# Patient Record
Sex: MALE | Race: BLACK OR AFRICAN AMERICAN | NOT HISPANIC OR LATINO
[De-identification: names, ages, dates, MRNs, and addresses within clinical notes are randomized per-mention and may not be internally consistent; named-entity substitution may affect disease eponyms.]

---

## 2019-01-17 ENCOUNTER — RESULT REVIEW (OUTPATIENT)
Age: 54
End: 2019-01-17

## 2019-11-26 ENCOUNTER — APPOINTMENT (OUTPATIENT)
Dept: OTOLARYNGOLOGY | Facility: CLINIC | Age: 54
End: 2019-11-26
Payer: COMMERCIAL

## 2019-11-26 VITALS
HEIGHT: 67 IN | DIASTOLIC BLOOD PRESSURE: 89 MMHG | WEIGHT: 178 LBS | SYSTOLIC BLOOD PRESSURE: 129 MMHG | BODY MASS INDEX: 27.94 KG/M2

## 2019-11-26 PROBLEM — Z00.00 ENCOUNTER FOR PREVENTIVE HEALTH EXAMINATION: Status: ACTIVE | Noted: 2019-11-26

## 2019-11-26 PROCEDURE — 31575 DIAGNOSTIC LARYNGOSCOPY: CPT

## 2019-11-26 PROCEDURE — 99204 OFFICE O/P NEW MOD 45 MIN: CPT | Mod: 25

## 2019-11-26 NOTE — PHYSICAL EXAM
[de-identified] : submental and right indurated masses. not painful, mobile.  [Midline] : trachea located in midline position [Normal] : orientation to person, place, and time: normal

## 2019-11-26 NOTE — HISTORY OF PRESENT ILLNESS
[de-identified] : Patient here today as a new patient c/o neck and chin mass.\par Discovered by his dentist 5 months ago. It has not changed in size. He also feels another LN on the right side of his neck. \par LNs not painful. \par No change in voice or swallowing. \par No recent infections.\par Never smoked cigarettes. \par

## 2019-12-02 ENCOUNTER — OTHER (OUTPATIENT)
Age: 54
End: 2019-12-02

## 2019-12-11 ENCOUNTER — OUTPATIENT (OUTPATIENT)
Dept: OUTPATIENT SERVICES | Facility: HOSPITAL | Age: 54
LOS: 1 days | Discharge: HOME | End: 2019-12-11

## 2019-12-11 ENCOUNTER — LABORATORY RESULT (OUTPATIENT)
Age: 54
End: 2019-12-11

## 2019-12-12 DIAGNOSIS — E04.9 NONTOXIC GOITER, UNSPECIFIED: ICD-10-CM

## 2019-12-18 ENCOUNTER — OUTPATIENT (OUTPATIENT)
Dept: OUTPATIENT SERVICES | Facility: HOSPITAL | Age: 54
LOS: 1 days | Discharge: HOME | End: 2019-12-18

## 2019-12-18 ENCOUNTER — LABORATORY RESULT (OUTPATIENT)
Age: 54
End: 2019-12-18

## 2019-12-18 ENCOUNTER — APPOINTMENT (OUTPATIENT)
Dept: OTOLARYNGOLOGY | Facility: CLINIC | Age: 54
End: 2019-12-18
Payer: COMMERCIAL

## 2019-12-18 DIAGNOSIS — Z22.1 CARRIER OF OTHER INTESTINAL INFECTIOUS DISEASES: ICD-10-CM

## 2019-12-18 PROCEDURE — 99214 OFFICE O/P EST MOD 30 MIN: CPT

## 2019-12-18 NOTE — PHYSICAL EXAM
[de-identified] : submental and right indurated masses. not painful, mobile.  [Midline] : trachea located in midline position [Normal] : no rashes

## 2019-12-18 NOTE — HISTORY OF PRESENT ILLNESS
[FreeTextEntry1] : 12/18/19 Patient is returning for a neck mass. he was sent for an FNA but did not do his CT scan. [de-identified] : Patient here today as a new patient c/o neck and chin mass.\par Discovered by his dentist 5 months ago. It has not changed in size. He also feels another LN on the right side of his neck. \par LNs not painful. \par No change in voice or swallowing. \par No recent infections.\par Never smoked cigarettes. \par

## 2019-12-18 NOTE — ASSESSMENT
[FreeTextEntry1] : I reviewed patient's FNA results with him.\par I also told him I spoke with Dr Junior regarding his results, we had discussed the need of an open biopsy.\par \par I recommended a CT scan at this time to plan his surgery.

## 2019-12-24 ENCOUNTER — OUTPATIENT (OUTPATIENT)
Dept: OUTPATIENT SERVICES | Facility: HOSPITAL | Age: 54
LOS: 1 days | Discharge: HOME | End: 2019-12-24
Payer: COMMERCIAL

## 2019-12-24 VITALS
TEMPERATURE: 98 F | OXYGEN SATURATION: 99 % | RESPIRATION RATE: 16 BRPM | DIASTOLIC BLOOD PRESSURE: 76 MMHG | HEART RATE: 70 BPM | WEIGHT: 194.01 LBS | HEIGHT: 67 IN | SYSTOLIC BLOOD PRESSURE: 132 MMHG

## 2019-12-24 DIAGNOSIS — R22.1 LOCALIZED SWELLING, MASS AND LUMP, NECK: ICD-10-CM

## 2019-12-24 DIAGNOSIS — N20.0 CALCULUS OF KIDNEY: Chronic | ICD-10-CM

## 2019-12-24 DIAGNOSIS — Z01.818 ENCOUNTER FOR OTHER PREPROCEDURAL EXAMINATION: ICD-10-CM

## 2019-12-24 DIAGNOSIS — Z98.890 OTHER SPECIFIED POSTPROCEDURAL STATES: Chronic | ICD-10-CM

## 2019-12-24 LAB
ALBUMIN SERPL ELPH-MCNC: 4.2 G/DL — SIGNIFICANT CHANGE UP (ref 3.5–5.2)
ALP SERPL-CCNC: 39 U/L — SIGNIFICANT CHANGE UP (ref 30–115)
ALT FLD-CCNC: 7 U/L — SIGNIFICANT CHANGE UP (ref 0–41)
ANION GAP SERPL CALC-SCNC: 15 MMOL/L — HIGH (ref 7–14)
APTT BLD: 32.2 SEC — SIGNIFICANT CHANGE UP (ref 27–39.2)
AST SERPL-CCNC: 21 U/L — SIGNIFICANT CHANGE UP (ref 0–41)
BASOPHILS # BLD AUTO: 0.02 K/UL — SIGNIFICANT CHANGE UP (ref 0–0.2)
BASOPHILS NFR BLD AUTO: 0.5 % — SIGNIFICANT CHANGE UP (ref 0–1)
BILIRUB SERPL-MCNC: 0.3 MG/DL — SIGNIFICANT CHANGE UP (ref 0.2–1.2)
BUN SERPL-MCNC: 16 MG/DL — SIGNIFICANT CHANGE UP (ref 10–20)
CALCIUM SERPL-MCNC: 8.9 MG/DL — SIGNIFICANT CHANGE UP (ref 8.5–10.1)
CHLORIDE SERPL-SCNC: 104 MMOL/L — SIGNIFICANT CHANGE UP (ref 98–110)
CO2 SERPL-SCNC: 25 MMOL/L — SIGNIFICANT CHANGE UP (ref 17–32)
CREAT SERPL-MCNC: 1.2 MG/DL — SIGNIFICANT CHANGE UP (ref 0.7–1.5)
EOSINOPHIL # BLD AUTO: 0.09 K/UL — SIGNIFICANT CHANGE UP (ref 0–0.7)
EOSINOPHIL NFR BLD AUTO: 2.1 % — SIGNIFICANT CHANGE UP (ref 0–8)
GLUCOSE SERPL-MCNC: 105 MG/DL — HIGH (ref 70–99)
HCT VFR BLD CALC: 42.1 % — SIGNIFICANT CHANGE UP (ref 42–52)
HGB BLD-MCNC: 13.8 G/DL — LOW (ref 14–18)
IMM GRANULOCYTES NFR BLD AUTO: 0.2 % — SIGNIFICANT CHANGE UP (ref 0.1–0.3)
INR BLD: 0.94 RATIO — SIGNIFICANT CHANGE UP (ref 0.65–1.3)
LYMPHOCYTES # BLD AUTO: 2.42 K/UL — SIGNIFICANT CHANGE UP (ref 1.2–3.4)
LYMPHOCYTES # BLD AUTO: 57.2 % — HIGH (ref 20.5–51.1)
MCHC RBC-ENTMCNC: 30.9 PG — SIGNIFICANT CHANGE UP (ref 27–31)
MCHC RBC-ENTMCNC: 32.8 G/DL — SIGNIFICANT CHANGE UP (ref 32–37)
MCV RBC AUTO: 94.2 FL — HIGH (ref 80–94)
MONOCYTES # BLD AUTO: 0.32 K/UL — SIGNIFICANT CHANGE UP (ref 0.1–0.6)
MONOCYTES NFR BLD AUTO: 7.6 % — SIGNIFICANT CHANGE UP (ref 1.7–9.3)
NEUTROPHILS # BLD AUTO: 1.37 K/UL — LOW (ref 1.4–6.5)
NEUTROPHILS NFR BLD AUTO: 32.4 % — LOW (ref 42.2–75.2)
NRBC # BLD: 0 /100 WBCS — SIGNIFICANT CHANGE UP (ref 0–0)
PLATELET # BLD AUTO: 116 K/UL — LOW (ref 130–400)
POTASSIUM SERPL-MCNC: 4.1 MMOL/L — SIGNIFICANT CHANGE UP (ref 3.5–5)
POTASSIUM SERPL-SCNC: 4.1 MMOL/L — SIGNIFICANT CHANGE UP (ref 3.5–5)
PROT SERPL-MCNC: 6.8 G/DL — SIGNIFICANT CHANGE UP (ref 6–8)
PROTHROM AB SERPL-ACNC: 10.8 SEC — SIGNIFICANT CHANGE UP (ref 9.95–12.87)
RBC # BLD: 4.47 M/UL — LOW (ref 4.7–6.1)
RBC # FLD: 12.1 % — SIGNIFICANT CHANGE UP (ref 11.5–14.5)
SODIUM SERPL-SCNC: 144 MMOL/L — SIGNIFICANT CHANGE UP (ref 135–146)
WBC # BLD: 4.23 K/UL — LOW (ref 4.8–10.8)
WBC # FLD AUTO: 4.23 K/UL — LOW (ref 4.8–10.8)

## 2019-12-24 PROCEDURE — 93010 ELECTROCARDIOGRAM REPORT: CPT

## 2019-12-24 NOTE — H&P PST ADULT - ADDITIONAL PE
no frankie no loose teeth tmd > 3 fbd neck from no frankie no loose teeth tmd > 3 fbd neck from small firm mass lymph node submandibular

## 2019-12-24 NOTE — H&P PST ADULT - REASON FOR ADMISSION
54 yr old man to past for excisional lymph node biopsy no fever no cough uri wt loss uti cp palp sob pain at this time exercise tolerance 2 flights no frankie screen revd 54 yr old man to past for excisional lymph node biopsy  s/p fna per pt "negative  but the doctor wants to make sure " noted enlarged lymph node submandibular no others x 3 mos no fever no cough uri wt loss uti cp palp sob pain at this time exercise tolerance 2 flights no frankie screen revd

## 2019-12-26 ENCOUNTER — OUTPATIENT (OUTPATIENT)
Dept: OUTPATIENT SERVICES | Facility: HOSPITAL | Age: 54
LOS: 1 days | Discharge: HOME | End: 2019-12-26
Payer: COMMERCIAL

## 2019-12-26 DIAGNOSIS — R22.1 LOCALIZED SWELLING, MASS AND LUMP, NECK: ICD-10-CM

## 2019-12-26 DIAGNOSIS — N20.0 CALCULUS OF KIDNEY: Chronic | ICD-10-CM

## 2019-12-26 DIAGNOSIS — Z98.890 OTHER SPECIFIED POSTPROCEDURAL STATES: Chronic | ICD-10-CM

## 2019-12-26 PROCEDURE — 70491 CT SOFT TISSUE NECK W/DYE: CPT | Mod: 26

## 2019-12-27 ENCOUNTER — OTHER (OUTPATIENT)
Age: 54
End: 2019-12-27

## 2019-12-30 ENCOUNTER — OUTPATIENT (OUTPATIENT)
Dept: OUTPATIENT SERVICES | Facility: HOSPITAL | Age: 54
LOS: 1 days | Discharge: HOME | End: 2019-12-30
Payer: COMMERCIAL

## 2019-12-30 ENCOUNTER — APPOINTMENT (OUTPATIENT)
Dept: OTOLARYNGOLOGY | Facility: HOSPITAL | Age: 54
End: 2019-12-30
Payer: COMMERCIAL

## 2019-12-30 ENCOUNTER — RESULT REVIEW (OUTPATIENT)
Age: 54
End: 2019-12-30

## 2019-12-30 VITALS
RESPIRATION RATE: 16 BRPM | TEMPERATURE: 98 F | DIASTOLIC BLOOD PRESSURE: 82 MMHG | SYSTOLIC BLOOD PRESSURE: 143 MMHG | OXYGEN SATURATION: 100 % | HEART RATE: 71 BPM

## 2019-12-30 VITALS
HEIGHT: 67 IN | DIASTOLIC BLOOD PRESSURE: 71 MMHG | RESPIRATION RATE: 18 BRPM | OXYGEN SATURATION: 99 % | SYSTOLIC BLOOD PRESSURE: 134 MMHG | HEART RATE: 59 BPM | TEMPERATURE: 98 F | WEIGHT: 194.01 LBS

## 2019-12-30 DIAGNOSIS — N20.0 CALCULUS OF KIDNEY: Chronic | ICD-10-CM

## 2019-12-30 DIAGNOSIS — Z98.890 OTHER SPECIFIED POSTPROCEDURAL STATES: Chronic | ICD-10-CM

## 2019-12-30 PROBLEM — I10 ESSENTIAL (PRIMARY) HYPERTENSION: Chronic | Status: ACTIVE | Noted: 2019-12-24

## 2019-12-30 PROCEDURE — 38510 BIOPSY/REMOVAL LYMPH NODES: CPT

## 2019-12-30 PROCEDURE — 88360 TUMOR IMMUNOHISTOCHEM/MANUAL: CPT | Mod: 26

## 2019-12-30 PROCEDURE — 88342 IMHCHEM/IMCYTCHM 1ST ANTB: CPT | Mod: 26,59

## 2019-12-30 PROCEDURE — 88307 TISSUE EXAM BY PATHOLOGIST: CPT | Mod: 26

## 2019-12-30 PROCEDURE — 88341 IMHCHEM/IMCYTCHM EA ADD ANTB: CPT | Mod: 26,59

## 2019-12-30 PROCEDURE — 88189 FLOWCYTOMETRY/READ 16 & >: CPT

## 2019-12-30 RX ORDER — SODIUM CHLORIDE 9 MG/ML
1000 INJECTION, SOLUTION INTRAVENOUS
Refills: 0 | Status: DISCONTINUED | OUTPATIENT
Start: 2019-12-30 | End: 2020-02-06

## 2019-12-30 RX ORDER — MORPHINE SULFATE 50 MG/1
2 CAPSULE, EXTENDED RELEASE ORAL
Refills: 0 | Status: DISCONTINUED | OUTPATIENT
Start: 2019-12-30 | End: 2019-12-30

## 2019-12-30 RX ORDER — OXYCODONE HYDROCHLORIDE 5 MG/1
5 TABLET ORAL ONCE
Refills: 0 | Status: DISCONTINUED | OUTPATIENT
Start: 2019-12-30 | End: 2019-12-30

## 2019-12-30 RX ORDER — ONDANSETRON 8 MG/1
4 TABLET, FILM COATED ORAL ONCE
Refills: 0 | Status: DISCONTINUED | OUTPATIENT
Start: 2019-12-30 | End: 2020-02-06

## 2019-12-30 RX ADMIN — SODIUM CHLORIDE 100 MILLILITER(S): 9 INJECTION, SOLUTION INTRAVENOUS at 11:52

## 2019-12-30 NOTE — BRIEF OPERATIVE NOTE - OPERATION/FINDINGS
enlarged submental lymph node, ~2cm enlarged submental lymph node, ~2cm, sent for fresh pathologic review

## 2019-12-30 NOTE — PRE-ANESTHESIA EVALUATION ADULT - NSANTHOSAYNRD_GEN_A_CORE
No. HOSSEIN screening performed.  STOP BANG Legend: 0-2 = LOW Risk; 3-4 = INTERMEDIATE Risk; 5-8 = HIGH Risk
BREATHE, asthma action plan

## 2019-12-30 NOTE — ASU DISCHARGE PLAN (ADULT/PEDIATRIC) - CALL YOUR DOCTOR IF YOU HAVE ANY OF THE FOLLOWING:
Pain not relieved by Medications/Wound/Surgical Site with redness, or foul smelling discharge or pus

## 2019-12-30 NOTE — ASU DISCHARGE PLAN (ADULT/PEDIATRIC) - PAIN MANAGEMENT
Take acetaminophen (eg Tylenol) or ibuprofen (eg Motrin/Aleive) as needed for pain. Can also take Percocet (called to pharmacy). Do not exceed 3500mg/day acetaminophen

## 2019-12-30 NOTE — ASU DISCHARGE PLAN (ADULT/PEDIATRIC) - CARE PROVIDER_API CALL
lOive Bose)  Otolaryngology  [address] [place] [address], [address] [place] [address]  Phone: [phone]  Fax: [phone]  Follow Up Time: 1 week

## 2020-01-02 LAB — TM INTERPRETATION: SIGNIFICANT CHANGE UP

## 2020-01-06 LAB — SURGICAL PATHOLOGY STUDY: SIGNIFICANT CHANGE UP

## 2020-01-07 DIAGNOSIS — R59.9 ENLARGED LYMPH NODES, UNSPECIFIED: ICD-10-CM

## 2020-01-07 DIAGNOSIS — I10 ESSENTIAL (PRIMARY) HYPERTENSION: ICD-10-CM

## 2020-01-08 ENCOUNTER — APPOINTMENT (OUTPATIENT)
Dept: OTOLARYNGOLOGY | Facility: CLINIC | Age: 55
End: 2020-01-08
Payer: COMMERCIAL

## 2020-01-08 PROCEDURE — 99024 POSTOP FOLLOW-UP VISIT: CPT

## 2020-01-08 NOTE — HISTORY OF PRESENT ILLNESS
[FreeTextEntry1] : Patient here s/p excisional lymph node biopsy 12/30/19. Pathology report indicates follicular lymphoid hyperplasia. Thye lymphocyte immunophenotypic findings show no diagnostic abnormalities. Patient notes tenderness at biopsy site however doing well. Denies dysphagia and odynophagia.

## 2020-01-09 LAB — CHROM ANALY OVERALL INTERP SPEC-IMP: SIGNIFICANT CHANGE UP

## 2020-02-05 ENCOUNTER — APPOINTMENT (OUTPATIENT)
Dept: OTOLARYNGOLOGY | Facility: CLINIC | Age: 55
End: 2020-02-05
Payer: COMMERCIAL

## 2020-02-05 PROCEDURE — 99214 OFFICE O/P EST MOD 30 MIN: CPT

## 2020-02-05 NOTE — HISTORY OF PRESENT ILLNESS
[FreeTextEntry1] : Patient here s/p excisional lymph node biopsy 12/30/19. Pathology report indicates follicular lymphoid hyperplasia. Thye lymphocyte immunophenotypic findings show no diagnostic abnormalities. Patient notes tenderness at biopsy site however doing well. Denies dysphagia and odynophagia. \par \par 2/5/2020 patient is returning today for hx neck mass. patient states he is doing well. no new

## 2020-02-18 ENCOUNTER — OUTPATIENT (OUTPATIENT)
Dept: OUTPATIENT SERVICES | Facility: HOSPITAL | Age: 55
LOS: 1 days | Discharge: HOME | End: 2020-02-18
Payer: COMMERCIAL

## 2020-02-18 DIAGNOSIS — N20.0 CALCULUS OF KIDNEY: Chronic | ICD-10-CM

## 2020-02-18 DIAGNOSIS — Z98.890 OTHER SPECIFIED POSTPROCEDURAL STATES: Chronic | ICD-10-CM

## 2020-02-18 DIAGNOSIS — R22.1 LOCALIZED SWELLING, MASS AND LUMP, NECK: ICD-10-CM

## 2020-02-18 PROCEDURE — 76536 US EXAM OF HEAD AND NECK: CPT | Mod: 26

## 2020-03-02 ENCOUNTER — APPOINTMENT (OUTPATIENT)
Dept: OTOLARYNGOLOGY | Facility: CLINIC | Age: 55
End: 2020-03-02
Payer: COMMERCIAL

## 2020-03-02 PROCEDURE — 99213 OFFICE O/P EST LOW 20 MIN: CPT

## 2020-03-02 NOTE — HISTORY OF PRESENT ILLNESS
[FreeTextEntry1] : Patient following up on neck mass. Patient is s/p excisional lymph node biopsy 12/30/19. Patient had neck sonogram performed. no new symptoms. no dysphonia. no dysphagia.

## 2020-06-08 ENCOUNTER — APPOINTMENT (OUTPATIENT)
Dept: OTOLARYNGOLOGY | Facility: CLINIC | Age: 55
End: 2020-06-08
Payer: COMMERCIAL

## 2020-06-08 DIAGNOSIS — Z87.09 PERSONAL HISTORY OF OTHER DISEASES OF THE RESPIRATORY SYSTEM: ICD-10-CM

## 2020-06-08 PROCEDURE — 99213 OFFICE O/P EST LOW 20 MIN: CPT | Mod: 25

## 2020-06-08 PROCEDURE — 31575 DIAGNOSTIC LARYNGOSCOPY: CPT

## 2020-06-08 NOTE — HISTORY OF PRESENT ILLNESS
[FreeTextEntry1] : Patient presents today following up on neck mass. Patient states May 1st started having sore throat. Felt lump on the right side on his neck. Was put on antibiotics which cleared his sore throat and lump. \par No pain currently. no dysphagia. \par He has a history of cervical node excision.

## 2020-06-10 ENCOUNTER — RESULT REVIEW (OUTPATIENT)
Age: 55
End: 2020-06-10

## 2020-06-10 ENCOUNTER — OUTPATIENT (OUTPATIENT)
Dept: OUTPATIENT SERVICES | Facility: HOSPITAL | Age: 55
LOS: 1 days | Discharge: HOME | End: 2020-06-10
Payer: COMMERCIAL

## 2020-06-10 DIAGNOSIS — J02.9 ACUTE PHARYNGITIS, UNSPECIFIED: ICD-10-CM

## 2020-06-10 DIAGNOSIS — Z98.890 OTHER SPECIFIED POSTPROCEDURAL STATES: Chronic | ICD-10-CM

## 2020-06-10 DIAGNOSIS — N20.0 CALCULUS OF KIDNEY: Chronic | ICD-10-CM

## 2020-06-10 PROCEDURE — 76536 US EXAM OF HEAD AND NECK: CPT | Mod: 26

## 2020-09-09 ENCOUNTER — APPOINTMENT (OUTPATIENT)
Dept: OTOLARYNGOLOGY | Facility: CLINIC | Age: 55
End: 2020-09-09
Payer: COMMERCIAL

## 2020-09-09 PROCEDURE — 31575 DIAGNOSTIC LARYNGOSCOPY: CPT

## 2020-09-09 PROCEDURE — 99214 OFFICE O/P EST MOD 30 MIN: CPT | Mod: 25

## 2020-09-09 NOTE — HISTORY OF PRESENT ILLNESS
[de-identified] : Patient presents today following up on neck mass. Patient states May 1st started having sore throat. Felt lump on the right side on his neck. Was put on antibiotics which cleared his sore throat and lump. \par No pain currently. no dysphagia. \par He has a history of cervical node excision. [FreeTextEntry1] : \par 9/9/2020: Patient following up on neck mass. Patient had neck sonogram performed.

## 2020-11-14 ENCOUNTER — OUTPATIENT (OUTPATIENT)
Dept: OUTPATIENT SERVICES | Facility: HOSPITAL | Age: 55
LOS: 1 days | Discharge: HOME | End: 2020-11-14
Payer: COMMERCIAL

## 2020-11-14 DIAGNOSIS — R22.1 LOCALIZED SWELLING, MASS AND LUMP, NECK: ICD-10-CM

## 2020-11-14 DIAGNOSIS — Z98.890 OTHER SPECIFIED POSTPROCEDURAL STATES: Chronic | ICD-10-CM

## 2020-11-14 DIAGNOSIS — N20.0 CALCULUS OF KIDNEY: Chronic | ICD-10-CM

## 2020-11-14 PROCEDURE — 76536 US EXAM OF HEAD AND NECK: CPT | Mod: 26

## 2020-12-15 ENCOUNTER — APPOINTMENT (OUTPATIENT)
Dept: OTOLARYNGOLOGY | Facility: CLINIC | Age: 55
End: 2020-12-15
Payer: COMMERCIAL

## 2020-12-15 VITALS — TEMPERATURE: 98.2 F

## 2020-12-15 PROCEDURE — 31575 DIAGNOSTIC LARYNGOSCOPY: CPT

## 2020-12-15 PROCEDURE — 99213 OFFICE O/P EST LOW 20 MIN: CPT | Mod: 25

## 2020-12-15 PROCEDURE — 99072 ADDL SUPL MATRL&STAF TM PHE: CPT

## 2020-12-15 NOTE — HISTORY OF PRESENT ILLNESS
[de-identified] : Patient presents today following up on neck mass. Patient states May 1st started having sore throat. Felt lump on the right side on his neck. Was put on antibiotics which cleared his sore throat and lump. \par No pain currently. no dysphagia. \par He has a history of cervical node excision.\par \par 9/9/2020: Patient following up on neck mass. Patient had neck sonogram performed. [FreeTextEntry1] : \par 12/15/2020: Patient following up on neck mass. s/p previous biopsy.  Patient had neck sonogram performed. Denies any sore throat or throat infections since last visit. No dysphagia.

## 2020-12-15 NOTE — PHYSICAL EXAM
[Midline] : trachea located in midline position [Normal] : no rashes [de-identified] : stable LADNP

## 2020-12-23 PROBLEM — Z87.09 HISTORY OF SORE THROAT: Status: RESOLVED | Noted: 2020-06-08 | Resolved: 2020-12-23

## 2021-04-26 ENCOUNTER — APPOINTMENT (OUTPATIENT)
Dept: OTOLARYNGOLOGY | Facility: CLINIC | Age: 56
End: 2021-04-26
Payer: COMMERCIAL

## 2021-04-26 PROCEDURE — 99213 OFFICE O/P EST LOW 20 MIN: CPT | Mod: 25

## 2021-04-26 PROCEDURE — 99072 ADDL SUPL MATRL&STAF TM PHE: CPT

## 2021-04-26 PROCEDURE — 31575 DIAGNOSTIC LARYNGOSCOPY: CPT

## 2021-04-26 PROCEDURE — 69210 REMOVE IMPACTED EAR WAX UNI: CPT

## 2021-04-26 NOTE — HISTORY OF PRESENT ILLNESS
[de-identified] : Patient presents today following up on neck mass. Patient states May 1st started having sore throat. Felt lump on the right side on his neck. Was put on antibiotics which cleared his sore throat and lump. \par No pain currently. no dysphagia. \par He has a history of cervical node excision.\par \par 9/9/2020: Patient following up on neck mass. Patient had neck sonogram performed. \par \par \par 12/15/2020: Patient following up on neck mass. s/p previous biopsy.  Patient had neck sonogram performed. Denies any sore throat or throat infections since last visit. No dysphagia.  [FreeTextEntry1] : \par 4/26/21: Patient presents today following up on neck mass. Patient is s/p lymph node biopsy 2019. Patient doing well. No new complaints.

## 2021-04-26 NOTE — PHYSICAL EXAM
[Normal] : mucosa is normal [Midline] : trachea located in midline position [de-identified] : ~ 2 cm level 1A lymphadenopathy [de-identified] : left cerumen impaction cleaned.

## 2021-05-15 ENCOUNTER — OUTPATIENT (OUTPATIENT)
Dept: OUTPATIENT SERVICES | Facility: HOSPITAL | Age: 56
LOS: 1 days | Discharge: HOME | End: 2021-05-15
Payer: COMMERCIAL

## 2021-05-15 ENCOUNTER — RESULT REVIEW (OUTPATIENT)
Age: 56
End: 2021-05-15

## 2021-05-15 DIAGNOSIS — R22.1 LOCALIZED SWELLING, MASS AND LUMP, NECK: ICD-10-CM

## 2021-05-15 DIAGNOSIS — Z98.890 OTHER SPECIFIED POSTPROCEDURAL STATES: Chronic | ICD-10-CM

## 2021-05-15 DIAGNOSIS — N20.0 CALCULUS OF KIDNEY: Chronic | ICD-10-CM

## 2021-05-15 PROCEDURE — 76536 US EXAM OF HEAD AND NECK: CPT | Mod: 26

## 2021-06-17 ENCOUNTER — APPOINTMENT (OUTPATIENT)
Dept: UROLOGY | Facility: CLINIC | Age: 56
End: 2021-06-17
Payer: COMMERCIAL

## 2021-06-17 ENCOUNTER — TRANSCRIPTION ENCOUNTER (OUTPATIENT)
Age: 56
End: 2021-06-17

## 2021-06-17 DIAGNOSIS — Z86.79 PERSONAL HISTORY OF OTHER DISEASES OF THE CIRCULATORY SYSTEM: ICD-10-CM

## 2021-06-17 DIAGNOSIS — Z83.3 FAMILY HISTORY OF DIABETES MELLITUS: ICD-10-CM

## 2021-06-17 DIAGNOSIS — Z78.9 OTHER SPECIFIED HEALTH STATUS: ICD-10-CM

## 2021-06-17 PROBLEM — Z00.00 ENCOUNTER FOR PREVENTIVE HEALTH EXAMINATION: Noted: 2021-06-17

## 2021-06-17 PROCEDURE — 99204 OFFICE O/P NEW MOD 45 MIN: CPT

## 2021-06-21 NOTE — ASSESSMENT
[FreeTextEntry1] : 55 year old presents for General Urology Check UP.\par Urinary symptoms include post void dribbling. Not bothered enough to requests treatment. \par ED- patient requests medication for ED. \par Prostate Ca screening- Last PSA was in 2019. \par \par Plan\par -Tadalafil 5 mg ordered. Side effects reviewed. \par -PSA ordered\par -Kidney Bladder US ordered\par -Follow up 1 month to review

## 2021-06-21 NOTE — LETTER BODY
[Dear  ___] : Dear  [unfilled], [Consult Letter:] : I had the pleasure of evaluating your patient, [unfilled]. [Please see my note below.] : Please see my note below. [Sincerely,] : Sincerely, [FreeTextEntry3] : Rosa Head MD, FACS\par

## 2021-06-21 NOTE — HISTORY OF PRESENT ILLNESS
[FreeTextEntry1] : JAZMIN WINN is a 55 year year old presenting for a General Urology Check Up. \par Patient has a past medical history of kidney stones, hypertension.\par \par Urination symptoms: Patient states that he has post void dribbling. Denies nocturia. Denies incontinence. Denies dysuria and gross hematuria. Patient does not need any \par IPSS: 3/35\par \par Erections: Patient states that he sometimes have difficulty getting and maintaining erection. Patient states that when he does get an erection, the erection is firm enough to penetrate. Patient denies taking medication for erections. Patient would like to try a medication. \par Patient also reports slight curve of penis when erect. \par Patient denies history of heart attacks. Denies taking nitroglycerine medications. Patient states he is able to run 3-4 miles 3-4 x a week. \par IIEF: 19- Mild Erectile Dysfunction. \par \par Prostate Cancer Screening: Patient had PSA last done 2019. \par \par Liquid Intake:\par Occupation: [employer]\par \par Family history- Denies family history of prostate, bladder, and kidney cancer. \par \par Old Records:\par PSA 07/2019- 0.23 ng/mL \par \par \par

## 2021-06-21 NOTE — PHYSICAL EXAM
[General Appearance - In No Acute Distress] : no acute distress [] : no respiratory distress [Urethral Meatus] : meatus normal [Penis Abnormality] : normal circumcised penis [Normal Station and Gait] : the gait and station were normal for the patient's age [Skin Color & Pigmentation] : normal skin color and pigmentation [No Focal Deficits] : no focal deficits [Oriented To Time, Place, And Person] : oriented to person, place, and time [FreeTextEntry1] : No plaques palpated.

## 2021-06-21 NOTE — ADDENDUM
[FreeTextEntry1] : Documented by ELAN Beckford acting as a scribe for Dr. Rosa Head \par \par All medical record entries made by the Scribe were at my, Dr. Head direction and\par personally dictated by me.  I have reviewed the chart and agree that the record\par accurately reflects my personal performance of the history, physical exam, procedure and imaging.  \par  \par \par

## 2021-07-03 ENCOUNTER — OUTPATIENT (OUTPATIENT)
Dept: OUTPATIENT SERVICES | Facility: HOSPITAL | Age: 56
LOS: 1 days | Discharge: HOME | End: 2021-07-03
Payer: COMMERCIAL

## 2021-07-03 DIAGNOSIS — N40.1 BENIGN PROSTATIC HYPERPLASIA WITH LOWER URINARY TRACT SYMPTOMS: ICD-10-CM

## 2021-07-03 DIAGNOSIS — Z98.890 OTHER SPECIFIED POSTPROCEDURAL STATES: Chronic | ICD-10-CM

## 2021-07-03 DIAGNOSIS — N20.0 CALCULUS OF KIDNEY: Chronic | ICD-10-CM

## 2021-07-03 PROCEDURE — 76770 US EXAM ABDO BACK WALL COMP: CPT | Mod: 26

## 2021-07-12 LAB
PSA FREE FLD-MCNC: 35 %
PSA FREE SERPL-MCNC: 0.07 NG/ML
PSA SERPL-MCNC: 0.2 NG/ML

## 2021-07-22 ENCOUNTER — APPOINTMENT (OUTPATIENT)
Dept: UROLOGY | Facility: CLINIC | Age: 56
End: 2021-07-22
Payer: COMMERCIAL

## 2021-07-22 VITALS — BODY MASS INDEX: 29.51 KG/M2 | HEIGHT: 67 IN | WEIGHT: 188 LBS

## 2021-07-22 PROCEDURE — 99214 OFFICE O/P EST MOD 30 MIN: CPT

## 2021-07-29 ENCOUNTER — LABORATORY RESULT (OUTPATIENT)
Age: 56
End: 2021-07-29

## 2021-08-02 ENCOUNTER — APPOINTMENT (OUTPATIENT)
Dept: UROLOGY | Facility: CLINIC | Age: 56
End: 2021-08-02
Payer: COMMERCIAL

## 2021-08-02 PROCEDURE — 99214 OFFICE O/P EST MOD 30 MIN: CPT

## 2021-08-02 NOTE — PHYSICAL EXAM
[General Appearance - In No Acute Distress] : no acute distress [Urethral Meatus] : meatus normal [Penis Abnormality] : normal circumcised penis [FreeTextEntry1] : No plaques palpated.  [Skin Color & Pigmentation] : normal skin color and pigmentation [] : no respiratory distress [Oriented To Time, Place, And Person] : oriented to person, place, and time [Normal Station and Gait] : the gait and station were normal for the patient's age [No Focal Deficits] : no focal deficits

## 2021-08-02 NOTE — ASSESSMENT
[FreeTextEntry1] : 56 yo with diminished desire for sexual intercourse\par related most likely to working many late shifts\par \par PSA reviewed\par US reviewed\par F/T test levels\par f/u with above via telehealth

## 2021-08-02 NOTE — ASSESSMENT
[FreeTextEntry1] : 56 yo with diminished desire for sexual intercourse\par low T and normal PSA\par \par discussed the role of testosterone in sexual desire\par explained the need for further assessment\par will refer to Dr. Clayton to discuss hormone replacement therapy\par will f/u with Dr. Clayton

## 2021-08-02 NOTE — HISTORY OF PRESENT ILLNESS
[FreeTextEntry1] : JAZMIN WINN is a 55 year year old presenting for follow up\par main complaint is diminished desire for sexual activity\par \par Patient has a past medical history of kidney stones, hypertension.\par \par Urination symptoms: Patient states that he has post void dribbling. Denies nocturia. Denies incontinence. Denies dysuria and gross hematuria. Patient does not need any \par IPSS: 3/35\par \par Erections: Patient states that he sometimes have difficulty getting and maintaining erection. Patient states that when he does get an erection, the erection is firm enough to penetrate. Patient denies taking medication for erections. Patient would like to try a medication. \par Patient also reports slight curve of penis when erect. \par Patient denies history of heart attacks. Denies taking nitroglycerine medications. Patient states he is able to run 3-4 miles 3-4 x a week. \par IIEF: 19- Mild Erectile Dysfunction. \par \par Prostate Cancer Screening: Patient had PSA last done 2019. \par \par Liquid Intake:\par Occupation: [employer] (doing a lot of overtime - sleep disturbances)\par \par Family history- Denies family history of prostate, bladder, and kidney cancer. \par \par PSA 0.2 ng/ml 7/2021\par \par renal and bladder US 7/2021\par FINDINGS:\par \par Right kidney: 10.8 cm. Cysts the larger of which measures 1.4 cm in its greatest diameter. No renal solid mass, hydronephrosis or calculi.\par \par Left kidney:  10.5 cm. No renal mass, hydronephrosis or calculi.\par \par Bilateral renal vascular flow\par \par Urinary bladder: No debris or calculus. Bilateral ureteral jets are visualized. Prevoid volume of approximately 338 cc.  Postvoid volume is approximately 32 cc\par \par Prostate volume 20 cc..\par \par \par \par IMPRESSION:\par \par 1.  Prostate volume 20 cc. Postvoid residual 32 cc.\par \par 2.  No hydronephrosis. Right renal 2 cysts.\par \par he feels his erections are suitable and functional\par but feels that he lacks the desire \par \par \par \par

## 2021-08-02 NOTE — HISTORY OF PRESENT ILLNESS
[FreeTextEntry1] : JAZMIN WINN is a 55 year year old presenting for follow up\par main complaint is diminished desire for sexual activity\par \par \par PSA 7/10/2021\par 0.2 ng/ml\par test 204\par sHBG 33.6\par \par Renal and Bladder US 7/2021\par FINDINGS:\par \par Right kidney: 10.8 cm. Cysts the larger of which measures 1.4 cm in its greatest diameter. No renal solid mass, hydronephrosis or calculi.\par \par Left kidney:  10.5 cm. No renal mass, hydronephrosis or calculi.\par \par Bilateral renal vascular flow\par \par Urinary bladder: No debris or calculus. Bilateral ureteral jets are visualized. Prevoid volume of approximately 338 cc.  Postvoid volume is approximately 32 cc\par \par Prostate volume 20 cc..\par \par \par IMPRESSION:\par \par 1.  Prostate volume 20 cc. Postvoid residual 32 cc.\par \par 2.  No hydronephrosis. Right renal 2 cysts.\par \par Family history- Denies family history of prostate, bladder, and kidney cancer. \par \par PSA 0.2 ng/ml 7/2021\par \par renal and bladder US 7/2021\par FINDINGS:\par \par Right kidney: 10.8 cm. Cysts the larger of which measures 1.4 cm in its greatest diameter. No renal solid mass, hydronephrosis or calculi.\par \par Left kidney:  10.5 cm. No renal mass, hydronephrosis or calculi.\par \par Bilateral renal vascular flow\par \par Urinary bladder: No debris or calculus. Bilateral ureteral jets are visualized. Prevoid volume of approximately 338 cc.  Postvoid volume is approximately 32 cc\par \par Prostate volume 20 cc..\par \par \par \par IMPRESSION:\par \par 1.  Prostate volume 20 cc. Postvoid residual 32 cc.\par \par 2.  No hydronephrosis. Right renal 2 cysts.\par \par he feels his erections are suitable and functional\par but feels that he lacks the desire \par \par \par \par

## 2021-08-19 ENCOUNTER — APPOINTMENT (OUTPATIENT)
Dept: UROLOGY | Facility: CLINIC | Age: 56
End: 2021-08-19
Payer: COMMERCIAL

## 2021-08-19 VITALS
HEART RATE: 62 BPM | WEIGHT: 193 LBS | DIASTOLIC BLOOD PRESSURE: 68 MMHG | BODY MASS INDEX: 30.29 KG/M2 | HEIGHT: 67 IN | SYSTOLIC BLOOD PRESSURE: 127 MMHG

## 2021-08-19 DIAGNOSIS — Z78.9 OTHER SPECIFIED HEALTH STATUS: ICD-10-CM

## 2021-08-19 DIAGNOSIS — Z84.1 FAMILY HISTORY OF DISORDERS OF KIDNEY AND URETER: ICD-10-CM

## 2021-08-19 PROCEDURE — 99214 OFFICE O/P EST MOD 30 MIN: CPT

## 2021-08-19 RX ORDER — LISINOPRIL 30 MG/1
TABLET ORAL
Refills: 0 | Status: ACTIVE | COMMUNITY

## 2021-08-19 NOTE — ASSESSMENT
[FreeTextEntry1] : There are several issues here.  #1 we have a low testosterone level but we only have 1 value #4 we can treat him this would need to be repeated his insurance company will not cover medication which is appropriate.  Once we start treating him we really can no longer get a negative value.\par \par He also has a history of stones and has not had any evaluation in several years for a we know he has a silent stone and it be best to know and treated electively than to have him come into the emergency room with acute pain

## 2021-08-19 NOTE — LETTER HEADER
[FreeTextEntry3] : Maryjane Martinez M.D.\par Director of Urology\par [facility]\par [address] [place] [address], [address]\[place] [address]

## 2021-08-19 NOTE — LETTER BODY
[Dear  ___] : Dear  [unfilled], [Courtesy Letter:] : I had the pleasure of seeing your patient, [unfilled], in my office today. [Please see my note below.] : Please see my note below. [Sincerely,] : Sincerely, [FreeTextEntry2] : Norman Akhtar MD\par [address] [address]\par

## 2021-08-19 NOTE — HISTORY OF PRESENT ILLNESS
[FreeTextEntry1] : Romero is a 56-year-old male born August 5, 1965 and was seen Dr. Valeria brennan for E[facility] urology checkup occluding a history of kidney stones status post lithotripsy several years ago and has not had an ultrasound in over 2 years.  He also had trouble with erections which has responded to tadalafil at the 5 mg dose.  He is using that is on-demand is working well enough with it.  However he really has no desire he has a willing partner his penis works he enjoys sex he just has no libido.  Dr. Rojas to call us sent him for testosterone levels that were was done on July 2019 came back as low he suggested he see me for subspecialty consultation.  As well he has not had an ultrasound in years and probably should have one to see if any more were made and if so get an evaluation to see why he is forming them

## 2021-08-19 NOTE — PHYSICAL EXAM
[General Appearance - Well Developed] : well developed [General Appearance - Well Nourished] : well nourished [Normal Appearance] : normal appearance [Well Groomed] : well groomed [General Appearance - In No Acute Distress] : no acute distress [Abdomen Soft] : soft [Abdomen Tenderness] : non-tender [Abdomen Hernia] : no hernia was discovered [Costovertebral Angle Tenderness] : no ~M costovertebral angle tenderness [Penis Abnormality] : normal circumcised penis [FreeTextEntry1] : There is eye plaque running the full length of the penis situated between the cavernosum and spongiosum which would give him a downward curve of probably close to 40 degrees both testicles are atrophic and nontender digital rectal exam was not done [Heart Rate And Rhythm] : Heart rate and rhythm were normal [Edema] : no peripheral edema [] : no respiratory distress [Respiration, Rhythm And Depth] : normal respiratory rhythm and effort [Exaggerated Use Of Accessory Muscles For Inspiration] : no accessory muscle use [Auscultation Breath Sounds / Voice Sounds] : lungs were clear to auscultation bilaterally [Affect] : the affect was normal [Oriented To Time, Place, And Person] : oriented to person, place, and time [Mood] : the mood was normal [Not Anxious] : not anxious [Normal Station and Gait] : the gait and station were normal for the patient's age [No Focal Deficits] : no focal deficits

## 2021-10-05 ENCOUNTER — APPOINTMENT (OUTPATIENT)
Dept: UROLOGY | Facility: CLINIC | Age: 56
End: 2021-10-05
Payer: COMMERCIAL

## 2021-10-05 VITALS
DIASTOLIC BLOOD PRESSURE: 72 MMHG | HEIGHT: 67 IN | WEIGHT: 192 LBS | BODY MASS INDEX: 30.13 KG/M2 | SYSTOLIC BLOOD PRESSURE: 129 MMHG | HEART RATE: 63 BPM | TEMPERATURE: 98 F

## 2021-10-05 PROCEDURE — 99215 OFFICE O/P EST HI 40 MIN: CPT

## 2021-10-05 NOTE — LETTER HEADER
[FreeTextEntry3] : Maryjane Martinez M.D.\par Director of Urology\par [facility]\par [address] [place] [address], [address]\[place] [address]

## 2021-10-05 NOTE — ASSESSMENT
[FreeTextEntry1] : We have documented low testosterone with 2 samples over a month apart and the question is which option for replacement does he want.\par \par Options include\par Gel–risk of transference requires daily application not an expensive\par Intramuscular–cheapest but is painful and can cause irritation of the muscle as the carrier fluid is a soybean-based oil\par Subcutaneous abdominal wall–Xyosted works very well injects once a week to once every 10 days not inexpensive\par Subcutaneous fat of the buttocks–Testopel gets put in every 3 to 4 months does not require self administration has a risk of "spitting" and is inserted with trocar by the physician.  Not inexpensive\par \par The risk benefits of the various methods were discussed I would recommend starting with the gel if no other reason that once we get him to a good testosterone level we have to make sure he does not get side effects which could include elevated estradiol, polycythemia, increased viscosity of the blood with clots etc.\par \par No matter what method we choose we monitor them and then once we have been to a good level see him infrequently.  If we happen to a good level of his erections improve on their own or he stopped responding better to the medication especially if his libido returns once he has normal hormones and even more so better function then we will have at home.

## 2021-10-05 NOTE — HISTORY OF PRESENT ILLNESS
[FreeTextEntry1] : Romero is a 56-year-old male born August 5, 1965 seen for subspecialty opinion on August 19, 2021.  Has a history of low testosterone we needed more than 1 sample before we could put him on treatment for her essentially the rest of his life.  He also has a history of kidney stones so we sent him for an ultrasound to see if there was anything there.  Blood tests were done in September 21, the ultrasound was done on July 3 and is here for review.\par \par He has erectile dysfunction with low libido with the hope is if we find low testosterone and replace it if things will get better [Erectile Dysfunction] : Erectile Dysfunction

## 2021-10-05 NOTE — LETTER BODY
[Dear  ___] : Dear  [unfilled], [Courtesy Letter:] : I had the pleasure of seeing your patient, [unfilled], in my office today. [Please see my note below.] : Please see my note below. [Sincerely,] : Sincerely, [FreeTextEntry2] : Norman Akhtar MD\par [address] [address]\par

## 2021-11-03 ENCOUNTER — APPOINTMENT (OUTPATIENT)
Dept: UROLOGY | Facility: CLINIC | Age: 56
End: 2021-11-03
Payer: COMMERCIAL

## 2021-11-03 VITALS
SYSTOLIC BLOOD PRESSURE: 160 MMHG | WEIGHT: 196 LBS | HEIGHT: 67 IN | DIASTOLIC BLOOD PRESSURE: 89 MMHG | BODY MASS INDEX: 30.76 KG/M2 | HEART RATE: 70 BPM

## 2021-11-03 DIAGNOSIS — N52.9 MALE ERECTILE DYSFUNCTION, UNSPECIFIED: ICD-10-CM

## 2021-11-03 PROCEDURE — 99215 OFFICE O/P EST HI 40 MIN: CPT

## 2021-11-03 NOTE — ADDENDUM
[FreeTextEntry1] : Brought in the disc and we reviewed the film real-time.  On Sunday the stone was about an inch from the bladder.  He does not have any CVA or abdominal tenderness and the question is doing intervene or do we wait and watch.  The stone is probably an 80% chance of passing but he started having the pain.  We have 2 options 1 he can continue the Flomax try and ride it out at most I would get a sono and see if he has a normal creatinine at the hydronephrosis when awake as this could just be residual edema.  If the pain becomes too much or if the stone is still there he does not want awake have to going and either take it out or put up a double-J and then come back a week or 2 later after the cystoscopy has been decompressed.  For now he chooses to give it a chance to pass.  We will make sure he has enough tamsulosin, can give him more Toradol but I can give him tramadol we have to revise the tramadol will constipate him and make him a little drowsy in the talk to work when you are taking that.  He could take Tylenol he could take some Motrin and he can alternate the 2.\par \par I will schedule an ultrasound for Monday if he passes a stone over the weekend and we will give him a plastic strainer, he will cancel it and bring it in if he does not pass the stone he will get the ultrasound Monday and see me Wednesday\par \par With respect to the right side he has a stone there is small we will get a KUB and see if we can see it and then we can discuss options including shockwave lithotripsy

## 2021-11-03 NOTE — HISTORY OF PRESENT ILLNESS
[Erectile Dysfunction] : Erectile Dysfunction [FreeTextEntry1] : Romero is a 56-year-old male born August 5, 1965 who we last saw her October 5, 2021 he was supposed to start on AndroGel and then get blood tests.  He tells me that the pharmacy never gave it to him telling him he had to call his doctor for his doctor to call the insurance company for an insurance company called the pharmacy.  When he has them I did they called directly they told him reportedly that its not their job the insurance company never called but he had trouble getting through.  He came in today because he had no other choice.  Nothing else has changed.\par \par \par With respect to his stones we have discussed doing some studies but he had not gotten around to it.  He told me this Sunday he started having some pain.  He went to the emergency room at [facility] where a CAT scan was done he did not have the results other than to say this was a ureteral stone on the left they gave him some Flomax and sent him out.  He was initially seen earlier today did not have the disc he went to get it and now is back here for further.  He tells me he still having pain on the left side.  On a scale of 1-10 it is equal to 5 it is colicky in nature gets bad for 2 to 3 minutes goes away for 2 to 3 minutes comes back for 2 to 3 minutes.  He is able to fall asleep for some time to get that enough to wake him.  They gave him some pain medicine along with the Flomax and he ran out of the medicine.  (With Toradol

## 2021-11-03 NOTE — ASSESSMENT
[FreeTextEntry1] : I spoke with our certified  and she says no one ever contacted her and she will call the insurance company now.  In the meantime I am going to give him a new prescription as the previous one is going to be 30 days old and now the pharmacy is not going to cover it\par \par With respect to his stones I am sorry he is having pain but unless I have data there is nothing I can do.  I have asked him to get me not just the report but the disc so I can look at it myself once he has the disc we will get him in for an urgent appointment\par \par He came back in just before closing with the disc and was reviewed with him and it showed\par On the right side he has a renal cyst anterolaterally in the upper half and about a 5 mm stone posterolaterally in the upper half\par \par On the left he has perinephric stranding with hydroureteronephrosis down to about a 3 mm stone about an inch from the left UVJ

## 2021-11-03 NOTE — LETTER BODY
[Dear  ___] : Dear  [unfilled], [Courtesy Letter:] : I had the pleasure of seeing your patient, [unfilled], in my office today. [Please see my note below.] : Please see my note below. [Sincerely,] : Sincerely, [FreeTextEntry2] : Norman Akhtar MD\par [address] [address]\par

## 2021-11-03 NOTE — LETTER HEADER
[FreeTextEntry3] : Maryjane Martinez M.D.\par Director of Urology\par [facility]\par [address] [place] [address], [address]\[place] [address]

## 2021-11-03 NOTE — PHYSICAL EXAM
[General Appearance - Well Developed] : well developed [General Appearance - Well Nourished] : well nourished [Normal Appearance] : normal appearance [Well Groomed] : well groomed [General Appearance - In No Acute Distress] : no acute distress [] : no respiratory distress [Respiration, Rhythm And Depth] : normal respiratory rhythm and effort [Exaggerated Use Of Accessory Muscles For Inspiration] : no accessory muscle use [Oriented To Time, Place, And Person] : oriented to person, place, and time [Affect] : the affect was normal [Mood] : the mood was normal [Not Anxious] : not anxious [Normal Station and Gait] : the gait and station were normal for the patient's age [Abdomen Tenderness] : non-tender [Costovertebral Angle Tenderness] : no ~M costovertebral angle tenderness

## 2021-11-10 ENCOUNTER — APPOINTMENT (OUTPATIENT)
Dept: UROLOGY | Facility: CLINIC | Age: 56
End: 2021-11-10
Payer: COMMERCIAL

## 2021-11-10 VITALS
DIASTOLIC BLOOD PRESSURE: 73 MMHG | WEIGHT: 196 LBS | HEART RATE: 69 BPM | TEMPERATURE: 98 F | HEIGHT: 67 IN | BODY MASS INDEX: 30.76 KG/M2 | SYSTOLIC BLOOD PRESSURE: 138 MMHG

## 2021-11-10 PROCEDURE — 99214 OFFICE O/P EST MOD 30 MIN: CPT

## 2021-11-10 RX ORDER — KETOROLAC TROMETHAMINE 10 MG
10 TABLET ORAL
Refills: 0 | Status: COMPLETED | COMMUNITY
End: 2021-11-10

## 2021-11-10 RX ORDER — TAMSULOSIN HCL 0.4 MG
CAPSULE ORAL
Refills: 0 | Status: COMPLETED | COMMUNITY
End: 2021-11-10

## 2021-11-10 NOTE — HISTORY OF PRESENT ILLNESS
[Erectile Dysfunction] : Erectile Dysfunction [FreeTextEntry1] : Romero was seen last week with renal colic I felt the stone was close enough that it should pass I wanted an ultrasound if it did not and a KUB either way to see if we could do shockwave lithotripsy on the right sided stone falls into the ureter and bothersome.\par The stone passed 2 days after I last saw him he brought it in we will send that off for analysis.\par \par As far as the testosterone he tells me the insurance company is still saying they did not get enough information

## 2021-11-10 NOTE — LETTER HEADER
[FreeTextEntry3] : Maryjane Martinze M.D.\par Director of Urology\par [facility]\par [address] [place] [address], [address]\[place] [address]

## 2021-11-10 NOTE — LETTER BODY
[Dear  ___] : Dear  [unfilled], [Courtesy Letter:] : I had the pleasure of seeing your patient, [unfilled], in my office today. [Please see my note below.] : Please see my note below. [Sincerely,] : Sincerely, [FreeTextEntry2] : Norman Akhtar MD\par [address] [address]\par

## 2021-11-10 NOTE — ASSESSMENT
[FreeTextEntry1] : With respect to stones he was on the current stone or for analysis, we will get a metabolic work-up x2 in the event 6 weeks, he really has 2 stones or rather had to his he passed 1 and they were bilateral. I still want the KUB so I can see if I can do shockwave on the remaining stone to get it before it bothers him. As for stone prevention please see below depending on what we find in the evaluations we may incorporate nephrologic and/or nutrition consultation\par \par As far as his testosterone are authorizing agent tells us that the insurance company got what they needed it was okay his insurance company, at least as far as [employer], I still not authorize dispensation. He has not gotten anything in writing from them since we last spoke but he did go to the [employer] website here in the office and it says the insurance company still needs more information\par \par We discussed methods of stone prevention and gave him the website listed below Diet modification for stone includes:\par \par Increasing fluid intake to produce 2 to 2.5 liters of urine per day (approx 3 liters intake), and should be primarily water. \par \par Calcium intake should be approximately 1000 mg per day. \par \par Oxalate intake should be reduced- most common sources in diet which have very high levels include peanuts/nuts, tea, coffee, chocolate, spinach, beets, rhubarb, swiss chard. I've also given/directed to a list with other high oxalate containing foods.\par  \par Animal flesh protein should be controlled- approx 4 to 6 ounces per day, with some vegetarian days included in the week. Studies have shown that vegetarians have half the risk of stones of people who eat only 4 ounces per day of meat or fish of any kind (beef, chicken, fish, shellfish, pork, etc), indicating that a vegetarian lifestyle can decrease future stone risks.\par \par Finally, salt intake should be reduced as high levels in the diet will increase urinary calcium. <2400 mg per day on a low sodium diet is strongly recommended.\par \par Citrate is a benefit; eric and limes with most citrate and least sugar- recommend 'a lemon or lime a day'; easiest with concentrate, mixed into water or other beverages.\par \par www.litholink.com ---> in the lower left column there is a link for "diet resources"\par \par .

## 2021-11-11 ENCOUNTER — APPOINTMENT (OUTPATIENT)
Dept: UROLOGY | Facility: CLINIC | Age: 56
End: 2021-11-11

## 2021-11-17 LAB — NIDUS STONE QN: NORMAL

## 2021-11-27 ENCOUNTER — OUTPATIENT (OUTPATIENT)
Dept: OUTPATIENT SERVICES | Facility: HOSPITAL | Age: 56
LOS: 1 days | Discharge: HOME | End: 2021-11-27
Payer: COMMERCIAL

## 2021-11-27 DIAGNOSIS — N20.1 CALCULUS OF URETER: ICD-10-CM

## 2021-11-27 DIAGNOSIS — Z98.890 OTHER SPECIFIED POSTPROCEDURAL STATES: Chronic | ICD-10-CM

## 2021-11-27 DIAGNOSIS — N20.0 CALCULUS OF KIDNEY: Chronic | ICD-10-CM

## 2021-11-27 DIAGNOSIS — N23 UNSPECIFIED RENAL COLIC: ICD-10-CM

## 2021-11-27 DIAGNOSIS — R10.9 UNSPECIFIED ABDOMINAL PAIN: ICD-10-CM

## 2021-11-27 DIAGNOSIS — N20.0 CALCULUS OF KIDNEY: ICD-10-CM

## 2021-11-27 PROCEDURE — 74019 RADEX ABDOMEN 2 VIEWS: CPT | Mod: 26

## 2021-12-20 ENCOUNTER — RESULT REVIEW (OUTPATIENT)
Age: 56
End: 2021-12-20

## 2021-12-20 ENCOUNTER — APPOINTMENT (OUTPATIENT)
Dept: UROLOGY | Facility: CLINIC | Age: 56
End: 2021-12-20
Payer: COMMERCIAL

## 2021-12-20 VITALS
BODY MASS INDEX: 30.76 KG/M2 | HEART RATE: 81 BPM | SYSTOLIC BLOOD PRESSURE: 140 MMHG | DIASTOLIC BLOOD PRESSURE: 82 MMHG | HEIGHT: 67 IN | WEIGHT: 196 LBS

## 2021-12-20 DIAGNOSIS — N20.1 CALCULUS OF URETER: ICD-10-CM

## 2021-12-20 DIAGNOSIS — N23 UNSPECIFIED RENAL COLIC: ICD-10-CM

## 2021-12-20 PROCEDURE — 99214 OFFICE O/P EST MOD 30 MIN: CPT

## 2021-12-20 RX ORDER — OXYCODONE AND ACETAMINOPHEN 5; 325 MG/1; MG/1
5-325 TABLET ORAL
Qty: 6 | Refills: 0 | Status: COMPLETED | COMMUNITY
Start: 2019-12-27 | End: 2021-12-20

## 2021-12-20 NOTE — ASSESSMENT
[FreeTextEntry1] : His numbers are normal but really just within normal limits.  There is a major improvement over the very poor levels he had before but at 56 and a young vibrant male who still feeling some side effects I think we can do better.  We will do the double the dose check his labs in 3 weeks and see him in 4.  If he is doing better but does not like the gel we can then look into alternatives.\par \par With respect to his erections he is taking tadalafil 5 mg an hour before sex he is almost out of pills so we will renew that\par \par With respect to his kidney stone the KUB showed a calcification overlying the right upper pole and to get an ultrasound to see if that is renal.  If it is we have briefly discussed shockwave lithotripsy we will discuss it at great length if we plan on proceeding.

## 2021-12-20 NOTE — LETTER BODY
[Dear  ___] : Dear  [unfilled], [Courtesy Letter:] : I had the pleasure of seeing your patient, [unfilled], in my office today. [Please see my note below.] : Please see my note below. [Sincerely,] : Sincerely, [FreeTextEntry2] : Norman Akhtar MD\par [address] [address]\par

## 2021-12-20 NOTE — HISTORY OF PRESENT ILLNESS
[FreeTextEntry1] : Romero is a 56-year-old male born August 5, 1965 who we last saw November 10, 2021 when he passed his left ureteral stone.  We sent him for a KUB which was done November 27.  He is no longer having any kidney stone pain.\par \par He also has low testosterone he was finally approved for the testosterone gel pump he has been putting on 2 pumps per day, he had laboratory studies done on December 2 and is here to review.  He tells me on the 2 pumps per day he is close to feeling okay, its not great but it is better than he wants.\par \par He also has erectile dysfunction for which he is taking tadalafil 5 mg about an hour before he attempt sexual activity he says that is working well and in fact is doing significantly better now that he has a normal testosterone.  He is almost out of would like a refill. [Erectile Dysfunction] : Erectile Dysfunction

## 2022-01-27 ENCOUNTER — OUTPATIENT (OUTPATIENT)
Dept: OUTPATIENT SERVICES | Facility: HOSPITAL | Age: 57
LOS: 1 days | Discharge: HOME | End: 2022-01-27
Payer: COMMERCIAL

## 2022-01-27 DIAGNOSIS — N20.0 CALCULUS OF KIDNEY: ICD-10-CM

## 2022-01-27 DIAGNOSIS — N20.0 CALCULUS OF KIDNEY: Chronic | ICD-10-CM

## 2022-01-27 DIAGNOSIS — Z98.890 OTHER SPECIFIED POSTPROCEDURAL STATES: Chronic | ICD-10-CM

## 2022-01-27 PROCEDURE — 76775 US EXAM ABDO BACK WALL LIM: CPT | Mod: 26

## 2022-02-03 ENCOUNTER — APPOINTMENT (OUTPATIENT)
Dept: UROLOGY | Facility: CLINIC | Age: 57
End: 2022-02-03
Payer: COMMERCIAL

## 2022-02-03 VITALS
WEIGHT: 201 LBS | HEIGHT: 67 IN | DIASTOLIC BLOOD PRESSURE: 95 MMHG | BODY MASS INDEX: 31.55 KG/M2 | HEART RATE: 69 BPM | SYSTOLIC BLOOD PRESSURE: 158 MMHG

## 2022-02-03 DIAGNOSIS — Z87.442 PERSONAL HISTORY OF URINARY CALCULI: ICD-10-CM

## 2022-02-03 PROCEDURE — 99213 OFFICE O/P EST LOW 20 MIN: CPT

## 2022-02-03 NOTE — ASSESSMENT
[FreeTextEntry1] : Ultrasound demonstrated no evidence of stone.  Overall he is doing well and elects for observation.\par \par Concerning his hormones, we had a lengthy discussion with regards of different modalities and he is electing to switch to Testopel if covered.  If  / when we get clearance from his insurance and if he is able to follow-up in 1 month for Testopel.  In the meantime we will send more AndroGel temporarily

## 2022-02-03 NOTE — HISTORY OF PRESENT ILLNESS
[Erectile Dysfunction] : Erectile Dysfunction [FreeTextEntry1] : Romero is a 56-year-old male who we have been following for history of renal stones and low testosterone.\par \par Prior KUB x-ray, from November 2021 demonstrated questionable 4 mm stone in the right kidney.  He presents to review his renal ultrasound today.\par \par He has been doing well at 2 pumps per day of AndroGel with good efficacy without adverse events.  Additionally he has been using 5 mg Cialis if needed, with good response.  He presents to review his blood work today\par \par

## 2022-02-03 NOTE — LETTER BODY
[Dear  ___] : Dear  [unfilled], [Courtesy Letter:] : I had the pleasure of seeing your patient, [unfilled], in my office today. [Please see my note below.] : Please see my note below. [Sincerely,] : Sincerely, [FreeTextEntry2] : Norman Akhtar MD\par [address] [address]\par

## 2022-02-03 NOTE — END OF VISIT
[FreeTextEntry3] : I, Dr. Martinez, personally performed the evaluation and management (E/M) services for this established patient who presents today with (a) new problem(s)/exacerbation of (an) existing condition(s).  That E/M includes conducting the examination, assessing all new/exacerbated conditions, and establishing a new plan of care.  Today, my ACP, Ortega Rae was here to observe my evaluation and management services for this new problem/exacerbated condition to be followed going forward.

## 2022-03-03 ENCOUNTER — APPOINTMENT (OUTPATIENT)
Dept: UROLOGY | Facility: CLINIC | Age: 57
End: 2022-03-03

## 2022-03-03 VITALS
HEART RATE: 62 BPM | WEIGHT: 200 LBS | HEIGHT: 67 IN | DIASTOLIC BLOOD PRESSURE: 90 MMHG | SYSTOLIC BLOOD PRESSURE: 160 MMHG | BODY MASS INDEX: 31.39 KG/M2

## 2022-08-22 ENCOUNTER — RESULT REVIEW (OUTPATIENT)
Age: 57
End: 2022-08-22

## 2022-08-22 ENCOUNTER — APPOINTMENT (OUTPATIENT)
Dept: UROLOGY | Facility: CLINIC | Age: 57
End: 2022-08-22

## 2022-08-22 VITALS
SYSTOLIC BLOOD PRESSURE: 142 MMHG | HEIGHT: 67 IN | DIASTOLIC BLOOD PRESSURE: 83 MMHG | HEART RATE: 64 BPM | WEIGHT: 195 LBS | BODY MASS INDEX: 30.61 KG/M2

## 2022-08-22 PROCEDURE — 99214 OFFICE O/P EST MOD 30 MIN: CPT

## 2022-08-22 NOTE — LETTER BODY
[Dear  ___] : Dear  [unfilled], [Courtesy Letter:] : I had the pleasure of seeing your patient, [unfilled], in my office today. [Please see my note below.] : Please see my note below. [Sincerely,] : Sincerely, [FreeTextEntry2] : Norman Akhtar MD\par [address] [address]\par

## 2022-08-22 NOTE — LETTER HEADER
[FreeTextEntry3] : Maryjane Martinez M.D.\par Director Emeritus of Urology\par [facility]\par [address] [place] [address], [address]\[place] [address]

## 2022-08-22 NOTE — ASSESSMENT
[FreeTextEntry1] : He will restart AndroGel and we will see him back in a month with blood work before.  if blood work demonstrates physiologic testosterone levels, without adverse side effects we will stop the AndroGel and start him on Testopel.\par \par Additionally, given the fact that this was his second stone and he does not want to go through this again, he will obtain renal ultrasound in 3 months as well as a metabolic work-up and follow-up to review at that time.

## 2022-09-16 ENCOUNTER — NON-APPOINTMENT (OUTPATIENT)
Age: 57
End: 2022-09-16

## 2022-09-16 ENCOUNTER — OUTPATIENT (OUTPATIENT)
Dept: OUTPATIENT SERVICES | Facility: HOSPITAL | Age: 57
LOS: 1 days | Discharge: HOME | End: 2022-09-16

## 2022-09-16 DIAGNOSIS — Z98.890 OTHER SPECIFIED POSTPROCEDURAL STATES: Chronic | ICD-10-CM

## 2022-09-16 DIAGNOSIS — N20.0 CALCULUS OF KIDNEY: ICD-10-CM

## 2022-09-16 DIAGNOSIS — N20.0 CALCULUS OF KIDNEY: Chronic | ICD-10-CM

## 2022-09-16 PROCEDURE — 76775 US EXAM ABDO BACK WALL LIM: CPT | Mod: 26

## 2022-09-23 ENCOUNTER — APPOINTMENT (OUTPATIENT)
Dept: UROLOGY | Facility: CLINIC | Age: 57
End: 2022-09-23

## 2022-09-23 VITALS
BODY MASS INDEX: 25.9 KG/M2 | RESPIRATION RATE: 16 BRPM | DIASTOLIC BLOOD PRESSURE: 65 MMHG | HEIGHT: 67 IN | HEART RATE: 73 BPM | WEIGHT: 165 LBS | SYSTOLIC BLOOD PRESSURE: 122 MMHG | OXYGEN SATURATION: 98 % | TEMPERATURE: 98 F

## 2022-09-23 PROCEDURE — 99213 OFFICE O/P EST LOW 20 MIN: CPT

## 2022-09-23 NOTE — LETTER BODY
[Dear  ___] : Dear  [unfilled], [Courtesy Letter:] : I had the pleasure of seeing your patient, [unfilled], in my office today. [Please see my note below.] : Please see my note below. [Sincerely,] : Sincerely, [FreeTextEntry2] : Norman Akhtar MD\par [address] [address]\par

## 2022-09-23 NOTE — LETTER HEADER
[FreeTextEntry3] : Maryjane Martinez M.D.\par Director Emeritus of Urology\par [facility]\par [address] [place] [address], [address]\[place] [address]

## 2022-09-23 NOTE — HISTORY OF PRESENT ILLNESS
[Currently Experiencing ___] :  [unfilled] [Erectile Dysfunction] : Erectile Dysfunction [FreeTextEntry1] : Romero is a 57-year-old male who we have been following for history of renal stones and low testosterone.\par \par He is currently s managed on AndroGel 4 pumps per day with good efficacy without adverse events.  \par \par He would like to switch to Testopel, and we will try and obtain approval for this.\par \par Additionally he has been using 5 mg Cialis as needed, with good response.  \par \par He has a history of renal stones and denies any episodes of renal colic.  His last renal sonogram January 27, 2022 demonstrated no evidence of stones\par \par He presents today to review his blood work on 4 pumps per day

## 2022-09-23 NOTE — ASSESSMENT
[FreeTextEntry1] : He is doing well at 4 pumps per day and will continue until he get approval from his insurance company for Testopel.  Once this happens we will bring him in and administer.\par \par Will continue Cialis 5 mg\par \par His latest ultrasound shows no stones

## 2022-09-26 ENCOUNTER — NON-APPOINTMENT (OUTPATIENT)
Age: 57
End: 2022-09-26

## 2022-10-06 ENCOUNTER — APPOINTMENT (OUTPATIENT)
Dept: UROLOGY | Facility: CLINIC | Age: 57
End: 2022-10-06

## 2022-11-14 ENCOUNTER — APPOINTMENT (OUTPATIENT)
Dept: UROLOGY | Facility: CLINIC | Age: 57
End: 2022-11-14

## 2022-11-14 VITALS
BODY MASS INDEX: 30.45 KG/M2 | WEIGHT: 194 LBS | DIASTOLIC BLOOD PRESSURE: 86 MMHG | HEIGHT: 67 IN | OXYGEN SATURATION: 99 % | SYSTOLIC BLOOD PRESSURE: 147 MMHG | TEMPERATURE: 98 F | RESPIRATION RATE: 16 BRPM | HEART RATE: 68 BPM

## 2022-11-14 PROCEDURE — 11980 IMPLANT HORMONE PELLET(S): CPT

## 2022-11-14 PROCEDURE — S0189: CPT

## 2022-11-14 PROCEDURE — 99214 OFFICE O/P EST MOD 30 MIN: CPT | Mod: 25

## 2022-11-14 NOTE — LETTER HEADER
[FreeTextEntry3] : Maryjane Martinez M.D.\par Director Emeritus of Urology\par [facility]\par [address] [place] [address], [address]\[place] [address]

## 2022-11-14 NOTE — ASSESSMENT
[FreeTextEntry1] : We discussed the risk benefits and he elected to go ahead with the Testopel.  He we will get 6 pellets into the left buttock and he will have blood tests drawn in 2 and 12 for peak and trough and see me in 13 weeks\par \par As far as the stone evaluation he will keep the appointment for 3 weeks from now we will go over it then\par \par As far as the ED I will give him a printed prescription for Cialis and he will price it around again I cautioned him not to get product over the Internet from Internet only pharmacies.  He should find a brick and mortar 1 and then order from them online.

## 2022-11-14 NOTE — HISTORY OF PRESENT ILLNESS
[Currently Experiencing ___] :  [unfilled] [Erectile Dysfunction] : Erectile Dysfunction [FreeTextEntry1] : oRmero is a 57-year-old male born August 5, 1965 he was supposed to get Testopel in the past but the trochars were not available he came in today without having r an appointment, he has 1 in several weeks to go over his metabolic work-up but he has run out of the gel does not want to order more would like to get Testopel so we added him to the schedule.  He has not had repeat bloods.\par \par He is using Cialis 5 mg but he is finding it hard to get around here at a price he can afford.  I suggested he search on the Internet as there are several places that do ship it but he has to be careful that it is the real thing not fake product from example [place] and the according to [place] law it has to be an American pharmacy he cannot order it from [facility].

## 2022-11-14 NOTE — PHYSICAL EXAM
[General Appearance - Well Developed] : well developed [General Appearance - Well Nourished] : well nourished [Normal Appearance] : normal appearance [Well Groomed] : well groomed [General Appearance - In No Acute Distress] : no acute distress [Abdomen Soft] : soft [Abdomen Tenderness] : non-tender [Costovertebral Angle Tenderness] : no ~M costovertebral angle tenderness [Heart Rate And Rhythm] : Heart rate and rhythm were normal [Edema] : no peripheral edema [] : no respiratory distress [Respiration, Rhythm And Depth] : normal respiratory rhythm and effort [Exaggerated Use Of Accessory Muscles For Inspiration] : no accessory muscle use [Auscultation Breath Sounds / Voice Sounds] : lungs were clear to auscultation bilaterally [Oriented To Time, Place, And Person] : oriented to person, place, and time [Affect] : the affect was normal [Mood] : the mood was normal [Not Anxious] : not anxious [No Focal Deficits] : no focal deficits

## 2022-11-14 NOTE — LETTER BODY
[Dear  ___] : Dear  [unfilled], [Courtesy Letter:] : I had the pleasure of seeing your patient, [unfilled], in my office today. [Please see my note below.] : Please see my note below. [Sincerely,] : Sincerely, [FreeTextEntry2] : Norman Akhtar MD\par [address] [address]\par

## 2022-11-22 NOTE — ASU PREOP CHECKLIST - DENTURES
CC:  Arnoldo Maier is here today for a physical exam and a follow up for anxiety.    Medications: medications verified and updated  Refills needed today?  NO  Recent PHQ 2/9 Score    PHQ 2:  Date Adult PHQ 2 Score Adult PHQ 2 Interpretation   11/10/2022 1 No further screening needed       PHQ 9:      Patient would like communication of their results via:      Cell Phone:   Telephone Information:   Mobile [phone]     Okay to leave a message containing results? No   Health Maintenance Due   Topic Date Due   • COVID-19 Vaccine (1) Never done   • DTaP/Tdap/Td Vaccine (5 - Tdap) 10/15/2004   • Influenza Vaccine (1) Never done       Patient is due for topics listed above, he wishes to proceed with Immunization(s) Influenza, but is not proceeding with Immunization(s) COVID-19 and Dtap/Tdap/Td at this time. The following has occurred: MD to discuss with the patient.             Reviewed overdue health maintenance topics with patient.   no

## 2023-02-15 ENCOUNTER — APPOINTMENT (OUTPATIENT)
Dept: UROLOGY | Facility: CLINIC | Age: 58
End: 2023-02-15
Payer: COMMERCIAL

## 2023-02-15 VITALS
WEIGHT: 182 LBS | SYSTOLIC BLOOD PRESSURE: 150 MMHG | BODY MASS INDEX: 28.56 KG/M2 | HEIGHT: 67 IN | HEART RATE: 79 BPM | DIASTOLIC BLOOD PRESSURE: 91 MMHG

## 2023-02-15 PROCEDURE — S0189: CPT

## 2023-02-15 PROCEDURE — 99214 OFFICE O/P EST MOD 30 MIN: CPT | Mod: 25

## 2023-02-15 PROCEDURE — 11980 IMPLANT HORMONE PELLET(S): CPT

## 2023-02-15 NOTE — ASSESSMENT
[FreeTextEntry1] : His labs have not yet bottomed out but the bioavailable was 114 (110–575) so we will proceed.  His estradiol for the peak was a little high but by the time he finished the 3 months he was down to 28 and if we plotted out he will be a little bit high for a month or so but it should be okay.  I would rather not give him more drugs.  We will proceed to give him 6 pellets (with the labs he got he can get more) into the right buttock get bloods in 13 weeks and see him in 14.  He has been asked to call before he comes in to make sure he is due for his next dose

## 2023-02-15 NOTE — LETTER BODY
[Dear  ___] : Dear  [unfilled], [Courtesy Letter:] : I had the pleasure of seeing your patient, [unfilled], in my office today. [Please see my note below.] : Please see my note below. [Sincerely,] : Sincerely, [FreeTextEntry2] : Norman Akhtar MD\par [address] [address]\par

## 2023-02-15 NOTE — HISTORY OF PRESENT ILLNESS
[Erectile Dysfunction] : Erectile Dysfunction [FreeTextEntry1] : Romero is a 57-year-old male last seen November 14, 2022.  He is on TRT with Testopel, he has ED which we are trying to treat with Cialis but has not been able to get it even overseas, and his last ultrasound September 16, 2022 showed a right renal cyst but no more stones.\par \par As far as his sex life is not what he was when he was 37 but with the testosterone at a good level even without Cialis is working well enough.\par \par He had blood test done on February 6, 2023 he is here to review the labs, get an exam and see if he is due for his next dose.  He tells me he is not feeling low but he does not want to get to that point so if you do feel like it now.

## 2023-02-15 NOTE — LETTER HEADER
[FreeTextEntry3] : Maryjane Martinez M.D.\par Director Emeritus of Urology\par [facility]\par [address] [place] [address], [address]\[place] [address]

## 2023-05-31 ENCOUNTER — APPOINTMENT (OUTPATIENT)
Dept: UROLOGY | Facility: CLINIC | Age: 58
End: 2023-05-31
Payer: COMMERCIAL

## 2023-05-31 VITALS
HEART RATE: 66 BPM | OXYGEN SATURATION: 98 % | TEMPERATURE: 98 F | BODY MASS INDEX: 29.03 KG/M2 | WEIGHT: 185 LBS | RESPIRATION RATE: 16 BRPM | SYSTOLIC BLOOD PRESSURE: 147 MMHG | DIASTOLIC BLOOD PRESSURE: 85 MMHG | HEIGHT: 67 IN

## 2023-05-31 DIAGNOSIS — G89.18 OTHER ACUTE POSTPROCEDURAL PAIN: ICD-10-CM

## 2023-05-31 DIAGNOSIS — N52.01 ERECTILE DYSFUNCTION DUE TO ARTERIAL INSUFFICIENCY: ICD-10-CM

## 2023-05-31 DIAGNOSIS — N40.1 BENIGN PROSTATIC HYPERPLASIA WITH LOWER URINARY TRACT SYMPMS: ICD-10-CM

## 2023-05-31 DIAGNOSIS — N20.0 CALCULUS OF KIDNEY: ICD-10-CM

## 2023-05-31 DIAGNOSIS — N28.1 CYST OF KIDNEY, ACQUIRED: ICD-10-CM

## 2023-05-31 DIAGNOSIS — Z87.438 PERSONAL HISTORY OF OTHER DISEASES OF MALE GENITAL ORGANS: ICD-10-CM

## 2023-05-31 PROCEDURE — 11980 IMPLANT HORMONE PELLET(S): CPT

## 2023-05-31 PROCEDURE — 99214 OFFICE O/P EST MOD 30 MIN: CPT | Mod: 25

## 2023-05-31 PROCEDURE — S0189: CPT

## 2023-05-31 RX ORDER — TESTOSTERONE 16.2 MG/G
20.25 MG/ACT GEL TRANSDERMAL
Qty: 2 | Refills: 0 | Status: COMPLETED | COMMUNITY
Start: 2021-10-05 | End: 2023-05-31

## 2023-05-31 NOTE — PHYSICAL EXAM
[General Appearance - Well Developed] : well developed [General Appearance - Well Nourished] : well nourished [Normal Appearance] : normal appearance [Well Groomed] : well groomed [General Appearance - In No Acute Distress] : no acute distress [Abdomen Tenderness] : non-tender [Abdomen Soft] : soft [Costovertebral Angle Tenderness] : no ~M costovertebral angle tenderness [Heart Rate And Rhythm] : Heart rate and rhythm were normal [Edema] : no peripheral edema [] : no respiratory distress [Respiration, Rhythm And Depth] : normal respiratory rhythm and effort [Exaggerated Use Of Accessory Muscles For Inspiration] : no accessory muscle use [Oriented To Time, Place, And Person] : oriented to person, place, and time [Affect] : the affect was normal [Mood] : the mood was normal [Not Anxious] : not anxious [Normal Station and Gait] : the gait and station were normal for the patient's age [No Focal Deficits] : no focal deficits

## 2023-05-31 NOTE — ASSESSMENT
[FreeTextEntry1] : He is due for the next dose we will give him 6 pellets into the left side get blood in 2-1/2 and see him in 3 months\par \par As far as his erections have returned to normal now that he has a normal testosterone\par \par As far as his stones he is keeping himself hydrated and watching his diet

## 2023-05-31 NOTE — LETTER HEADER
[FreeTextEntry3] : Maryjane Martinez M.D.\par Director Emeritus of Urology\par [facility]\par [address] [place] [address], [address]\[place] [address]

## 2023-05-31 NOTE — LETTER BODY
[Dear  ___] : Dear  [unfilled], [Courtesy Letter:] : I had the pleasure of seeing your patient, [unfilled], in my office today. [Please see my note below.] : Please see my note below. [Sincerely,] : Sincerely, [FreeTextEntry2] : Norman Akhtar MD\par [address] [address]\par

## 2023-05-31 NOTE — HISTORY OF PRESENT ILLNESS
[None] : no symptoms [FreeTextEntry1] : Romero is a 57-year-old male born August 6, 1965 last seen February 15, 2023.  He has a history of kidney stones, low testosterone, erectile dysfunction\par We had considered PDE 5 inhibitors but once we replace his testosterone his erections work more than good enough without any problem so he is not worried about that\par \par He passed a stone the last ultrasound we have was September 16, 2022 and that showed a right renal cyst up to 17 mm but no stones on either side.\par \par Finally he got Testopel 6 pellets into the right buttock on February 15 he tells me he is still feeling okay without any signs of low testosterone's.  He still working however he had bloods done May 15, 2023 and if he is due for the next dose he like to get it before he gets symptomatic.\par \par He has no other  issues no blood no pus no burning and no voiding dysfunction

## 2023-07-18 NOTE — HISTORY OF PRESENT ILLNESS
Medication and directions for refill request: alfuzosin 10mg 24 hour tablet, take 1 tablet by mouth at bedtime.     Last seen: 3/15/23    Last seen for: Acute urinary retention    Next office visit: 10/25/23    Last time medication refilled: 4/17/23    Please advise on refill of alfuzosin 10mg 24 hour tablet, take 1 tablet by mouth at bedtime.      [Erectile Dysfunction] : Erectile Dysfunction [FreeTextEntry1] : Romero is a 57-year-old male who we have been following for history of renal stones and low testosterone.\par \par Romero was last seen back in February where he was managed on AndroGel 2 pumps per day with good efficacy without adverse events.  At that time he elected to switch to Testopel, which was approved by his insurance.  \par \par He canceled his appointment and did not reschedule.  He tells us he has no recollection of having an appointment but cannot really explain why he did not call up when the gel ran out\par \par He presents today to discuss restarting testosterone.\par \par Additionally he has been using 5 mg Cialis as needed, with good response.  \par \par He has a history of renal stones and denies any episodes of renal colic.  His last renal sonogram January 27, 2022 demonstrated no evidence of stones\par  [Currently Experiencing ___] :  [unfilled]

## 2023-08-31 ENCOUNTER — APPOINTMENT (OUTPATIENT)
Dept: UROLOGY | Facility: CLINIC | Age: 58
End: 2023-08-31
Payer: COMMERCIAL

## 2023-08-31 VITALS
BODY MASS INDEX: 28.72 KG/M2 | SYSTOLIC BLOOD PRESSURE: 154 MMHG | DIASTOLIC BLOOD PRESSURE: 87 MMHG | WEIGHT: 183 LBS | HEIGHT: 67 IN | HEART RATE: 71 BPM

## 2023-08-31 PROCEDURE — 99214 OFFICE O/P EST MOD 30 MIN: CPT

## 2023-08-31 NOTE — LETTER BODY
[Dear  ___] : Dear  [unfilled], [Courtesy Letter:] : I had the pleasure of seeing your patient, [unfilled], in my office today. [Please see my note below.] : Please see my note below. [Sincerely,] : Sincerely, [FreeTextEntry2] : Norman Akhtar MD\par  [address] [address]\par

## 2023-08-31 NOTE — ASSESSMENT
[FreeTextEntry1] : His numbers are still good they were drawn in 2-1/2 months he may last longer.  We will get bloods the end of next week which will be at 15 weeks if he is indeed low we will get him in for his next dose.  He knows the next time before he comes in he should check to see if he is due for the pellets or if he needs to reschedule  We reviewed the risk benefit of testosterone place therapy occluding the fact that it is an anabolic steroid he is well aware of this we gone over before when he had no questions.  Please note his erections are still working which is consistent with the blood levels.

## 2023-08-31 NOTE — LETTER HEADER
[FreeTextEntry3] : Maryjane Martinez MD  [address] [place] [address], [address] [place] [id number]

## 2023-08-31 NOTE — HISTORY OF PRESENT ILLNESS
[FreeTextEntry1] : Romero is a 58-year-old male born August 5, 1965 last seen May 13, 2023.  He has a history of kidney stones, low testosterone, erectile dysfunction  He is on Testopel getting 6 pellets into the left buttock on May 31 and says will need testosterone is at a good level he has no trouble with erectile dysfunction.  He tells me he is beginning to feel although not terrible yet.  He had bloods drawn on August 14, 2023 he is here for review and exam and possibly repeat dosing  He passed a stone the last ultrasound we have was September 16, 2022 and that showed a right renal cyst up to 17 mm but no stones on either side.  He has not had a repeat ultrasound since that is over a year ago  He has no other  issues no blood no pus no burning and no voiding dysfunction.  Blood test were done August 15, 2023 Total testosterone was 519 Free testosterone was 81 Bioavailable testosterone was 152 Liver function tests were normal except for a low ALT at 7 Hemoglobin was  16.2 with a platelet count slightly low at 126,000.  Please note he has a history of low platelets Estradiol was 40 PSA was 0.2/50%

## 2023-09-14 ENCOUNTER — APPOINTMENT (OUTPATIENT)
Dept: UROLOGY | Facility: CLINIC | Age: 58
End: 2023-09-14
Payer: COMMERCIAL

## 2023-09-14 VITALS
HEIGHT: 67 IN | BODY MASS INDEX: 29.03 KG/M2 | DIASTOLIC BLOOD PRESSURE: 86 MMHG | WEIGHT: 185 LBS | SYSTOLIC BLOOD PRESSURE: 160 MMHG | TEMPERATURE: 99.2 F | HEART RATE: 70 BPM

## 2023-09-14 PROCEDURE — S0189: CPT

## 2023-09-14 PROCEDURE — 11980 IMPLANT HORMONE PELLET(S): CPT

## 2023-09-14 PROCEDURE — 99214 OFFICE O/P EST MOD 30 MIN: CPT | Mod: 25

## 2023-12-01 ENCOUNTER — INPATIENT (INPATIENT)
Facility: HOSPITAL | Age: 58
LOS: 0 days | Discharge: HOME | DRG: 661 | End: 2023-12-02
Attending: UROLOGY | Admitting: UROLOGY
Payer: COMMERCIAL

## 2023-12-01 VITALS
TEMPERATURE: 99 F | SYSTOLIC BLOOD PRESSURE: 144 MMHG | WEIGHT: 186.07 LBS | HEART RATE: 85 BPM | RESPIRATION RATE: 18 BRPM | DIASTOLIC BLOOD PRESSURE: 83 MMHG | OXYGEN SATURATION: 98 %

## 2023-12-01 DIAGNOSIS — N17.9 ACUTE KIDNEY FAILURE, UNSPECIFIED: ICD-10-CM

## 2023-12-01 DIAGNOSIS — Z98.890 OTHER SPECIFIED POSTPROCEDURAL STATES: Chronic | ICD-10-CM

## 2023-12-01 DIAGNOSIS — N20.0 CALCULUS OF KIDNEY: Chronic | ICD-10-CM

## 2023-12-01 LAB
ALBUMIN SERPL ELPH-MCNC: 3.8 G/DL — SIGNIFICANT CHANGE UP (ref 3.5–5.2)
ALBUMIN SERPL ELPH-MCNC: 3.8 G/DL — SIGNIFICANT CHANGE UP (ref 3.5–5.2)
ALP SERPL-CCNC: 55 U/L — SIGNIFICANT CHANGE UP (ref 30–115)
ALP SERPL-CCNC: 55 U/L — SIGNIFICANT CHANGE UP (ref 30–115)
ALT FLD-CCNC: 8 U/L — SIGNIFICANT CHANGE UP (ref 0–41)
ALT FLD-CCNC: 8 U/L — SIGNIFICANT CHANGE UP (ref 0–41)
ANION GAP SERPL CALC-SCNC: 10 MMOL/L — SIGNIFICANT CHANGE UP (ref 7–14)
ANION GAP SERPL CALC-SCNC: 10 MMOL/L — SIGNIFICANT CHANGE UP (ref 7–14)
ANION GAP SERPL CALC-SCNC: 11 MMOL/L — SIGNIFICANT CHANGE UP (ref 7–14)
ANION GAP SERPL CALC-SCNC: 11 MMOL/L — SIGNIFICANT CHANGE UP (ref 7–14)
APPEARANCE UR: CLEAR — SIGNIFICANT CHANGE UP
APPEARANCE UR: CLEAR — SIGNIFICANT CHANGE UP
APTT BLD: 33.6 SEC — SIGNIFICANT CHANGE UP (ref 27–39.2)
APTT BLD: 33.6 SEC — SIGNIFICANT CHANGE UP (ref 27–39.2)
AST SERPL-CCNC: 27 U/L — SIGNIFICANT CHANGE UP (ref 0–41)
AST SERPL-CCNC: 27 U/L — SIGNIFICANT CHANGE UP (ref 0–41)
BACTERIA # UR AUTO: NEGATIVE /HPF — SIGNIFICANT CHANGE UP
BACTERIA # UR AUTO: NEGATIVE /HPF — SIGNIFICANT CHANGE UP
BASE EXCESS BLDV CALC-SCNC: 1 MMOL/L — SIGNIFICANT CHANGE UP (ref -2–3)
BASE EXCESS BLDV CALC-SCNC: 1 MMOL/L — SIGNIFICANT CHANGE UP (ref -2–3)
BASE EXCESS BLDV CALC-SCNC: 2.6 MMOL/L — SIGNIFICANT CHANGE UP (ref -2–3)
BASE EXCESS BLDV CALC-SCNC: 2.6 MMOL/L — SIGNIFICANT CHANGE UP (ref -2–3)
BASOPHILS # BLD AUTO: 0.01 K/UL — SIGNIFICANT CHANGE UP (ref 0–0.2)
BASOPHILS # BLD AUTO: 0.01 K/UL — SIGNIFICANT CHANGE UP (ref 0–0.2)
BASOPHILS NFR BLD AUTO: 0.1 % — SIGNIFICANT CHANGE UP (ref 0–1)
BASOPHILS NFR BLD AUTO: 0.1 % — SIGNIFICANT CHANGE UP (ref 0–1)
BILIRUB SERPL-MCNC: 0.4 MG/DL — SIGNIFICANT CHANGE UP (ref 0.2–1.2)
BILIRUB SERPL-MCNC: 0.4 MG/DL — SIGNIFICANT CHANGE UP (ref 0.2–1.2)
BILIRUB UR-MCNC: NEGATIVE — SIGNIFICANT CHANGE UP
BILIRUB UR-MCNC: NEGATIVE — SIGNIFICANT CHANGE UP
BUN SERPL-MCNC: 16 MG/DL — SIGNIFICANT CHANGE UP (ref 10–20)
BUN SERPL-MCNC: 16 MG/DL — SIGNIFICANT CHANGE UP (ref 10–20)
BUN SERPL-MCNC: 18 MG/DL — SIGNIFICANT CHANGE UP (ref 10–20)
BUN SERPL-MCNC: 18 MG/DL — SIGNIFICANT CHANGE UP (ref 10–20)
CA-I SERPL-SCNC: 1.11 MMOL/L — LOW (ref 1.15–1.33)
CA-I SERPL-SCNC: 1.11 MMOL/L — LOW (ref 1.15–1.33)
CA-I SERPL-SCNC: 1.16 MMOL/L — SIGNIFICANT CHANGE UP (ref 1.15–1.33)
CA-I SERPL-SCNC: 1.16 MMOL/L — SIGNIFICANT CHANGE UP (ref 1.15–1.33)
CALCIUM SERPL-MCNC: 8.2 MG/DL — LOW (ref 8.4–10.5)
CALCIUM SERPL-MCNC: 8.2 MG/DL — LOW (ref 8.4–10.5)
CALCIUM SERPL-MCNC: 9.2 MG/DL — SIGNIFICANT CHANGE UP (ref 8.4–10.5)
CALCIUM SERPL-MCNC: 9.2 MG/DL — SIGNIFICANT CHANGE UP (ref 8.4–10.5)
CAST: 0 /LPF — SIGNIFICANT CHANGE UP (ref 0–4)
CAST: 0 /LPF — SIGNIFICANT CHANGE UP (ref 0–4)
CHLORIDE SERPL-SCNC: 101 MMOL/L — SIGNIFICANT CHANGE UP (ref 98–110)
CHLORIDE SERPL-SCNC: 101 MMOL/L — SIGNIFICANT CHANGE UP (ref 98–110)
CHLORIDE SERPL-SCNC: 103 MMOL/L — SIGNIFICANT CHANGE UP (ref 98–110)
CHLORIDE SERPL-SCNC: 103 MMOL/L — SIGNIFICANT CHANGE UP (ref 98–110)
CO2 SERPL-SCNC: 23 MMOL/L — SIGNIFICANT CHANGE UP (ref 17–32)
CO2 SERPL-SCNC: 23 MMOL/L — SIGNIFICANT CHANGE UP (ref 17–32)
CO2 SERPL-SCNC: 26 MMOL/L — SIGNIFICANT CHANGE UP (ref 17–32)
CO2 SERPL-SCNC: 26 MMOL/L — SIGNIFICANT CHANGE UP (ref 17–32)
COLOR SPEC: YELLOW — SIGNIFICANT CHANGE UP
COLOR SPEC: YELLOW — SIGNIFICANT CHANGE UP
CREAT SERPL-MCNC: 2.2 MG/DL — HIGH (ref 0.7–1.5)
CREAT SERPL-MCNC: 2.2 MG/DL — HIGH (ref 0.7–1.5)
CREAT SERPL-MCNC: 2.3 MG/DL — HIGH (ref 0.7–1.5)
CREAT SERPL-MCNC: 2.3 MG/DL — HIGH (ref 0.7–1.5)
DIFF PNL FLD: ABNORMAL
DIFF PNL FLD: ABNORMAL
EGFR: 33 ML/MIN/1.73M2 — LOW
EGFR: 33 ML/MIN/1.73M2 — LOW
EGFR: 34 ML/MIN/1.73M2 — LOW
EGFR: 34 ML/MIN/1.73M2 — LOW
EOSINOPHIL # BLD AUTO: 0.04 K/UL — SIGNIFICANT CHANGE UP (ref 0–0.7)
EOSINOPHIL # BLD AUTO: 0.04 K/UL — SIGNIFICANT CHANGE UP (ref 0–0.7)
EOSINOPHIL NFR BLD AUTO: 0.4 % — SIGNIFICANT CHANGE UP (ref 0–8)
EOSINOPHIL NFR BLD AUTO: 0.4 % — SIGNIFICANT CHANGE UP (ref 0–8)
GAS PNL BLDV: 129 MMOL/L — LOW (ref 136–145)
GAS PNL BLDV: 129 MMOL/L — LOW (ref 136–145)
GAS PNL BLDV: 134 MMOL/L — LOW (ref 136–145)
GAS PNL BLDV: 134 MMOL/L — LOW (ref 136–145)
GAS PNL BLDV: SIGNIFICANT CHANGE UP
GLUCOSE SERPL-MCNC: 110 MG/DL — HIGH (ref 70–99)
GLUCOSE SERPL-MCNC: 110 MG/DL — HIGH (ref 70–99)
GLUCOSE SERPL-MCNC: 130 MG/DL — HIGH (ref 70–99)
GLUCOSE SERPL-MCNC: 130 MG/DL — HIGH (ref 70–99)
GLUCOSE UR QL: NEGATIVE MG/DL — SIGNIFICANT CHANGE UP
GLUCOSE UR QL: NEGATIVE MG/DL — SIGNIFICANT CHANGE UP
HCO3 BLDV-SCNC: 26 MMOL/L — SIGNIFICANT CHANGE UP (ref 22–29)
HCO3 BLDV-SCNC: 26 MMOL/L — SIGNIFICANT CHANGE UP (ref 22–29)
HCO3 BLDV-SCNC: 30 MMOL/L — HIGH (ref 22–29)
HCO3 BLDV-SCNC: 30 MMOL/L — HIGH (ref 22–29)
HCT VFR BLD CALC: 46.5 % — SIGNIFICANT CHANGE UP (ref 42–52)
HCT VFR BLD CALC: 46.5 % — SIGNIFICANT CHANGE UP (ref 42–52)
HCT VFR BLDA CALC: 44 % — SIGNIFICANT CHANGE UP (ref 39–51)
HCT VFR BLDA CALC: 44 % — SIGNIFICANT CHANGE UP (ref 39–51)
HCT VFR BLDA CALC: 61 % — CRITICAL HIGH (ref 39–51)
HCT VFR BLDA CALC: 61 % — CRITICAL HIGH (ref 39–51)
HGB BLD CALC-MCNC: 14.8 G/DL — SIGNIFICANT CHANGE UP (ref 12.6–17.4)
HGB BLD CALC-MCNC: 14.8 G/DL — SIGNIFICANT CHANGE UP (ref 12.6–17.4)
HGB BLD CALC-MCNC: 20.2 G/DL — CRITICAL HIGH (ref 12.6–17.4)
HGB BLD CALC-MCNC: 20.2 G/DL — CRITICAL HIGH (ref 12.6–17.4)
HGB BLD-MCNC: 15.1 G/DL — SIGNIFICANT CHANGE UP (ref 14–18)
HGB BLD-MCNC: 15.1 G/DL — SIGNIFICANT CHANGE UP (ref 14–18)
IMM GRANULOCYTES NFR BLD AUTO: 0.5 % — HIGH (ref 0.1–0.3)
IMM GRANULOCYTES NFR BLD AUTO: 0.5 % — HIGH (ref 0.1–0.3)
INR BLD: 1.29 RATIO — SIGNIFICANT CHANGE UP (ref 0.65–1.3)
INR BLD: 1.29 RATIO — SIGNIFICANT CHANGE UP (ref 0.65–1.3)
KETONES UR-MCNC: 15 MG/DL
KETONES UR-MCNC: 15 MG/DL
LACTATE BLDV-MCNC: 0.7 MMOL/L — SIGNIFICANT CHANGE UP (ref 0.5–2)
LACTATE BLDV-MCNC: 0.7 MMOL/L — SIGNIFICANT CHANGE UP (ref 0.5–2)
LACTATE BLDV-MCNC: 1.3 MMOL/L — SIGNIFICANT CHANGE UP (ref 0.5–2)
LACTATE BLDV-MCNC: 1.3 MMOL/L — SIGNIFICANT CHANGE UP (ref 0.5–2)
LEUKOCYTE ESTERASE UR-ACNC: NEGATIVE — SIGNIFICANT CHANGE UP
LEUKOCYTE ESTERASE UR-ACNC: NEGATIVE — SIGNIFICANT CHANGE UP
LIDOCAIN IGE QN: 15 U/L — SIGNIFICANT CHANGE UP (ref 7–60)
LIDOCAIN IGE QN: 15 U/L — SIGNIFICANT CHANGE UP (ref 7–60)
LYMPHOCYTES # BLD AUTO: 1.42 K/UL — SIGNIFICANT CHANGE UP (ref 1.2–3.4)
LYMPHOCYTES # BLD AUTO: 1.42 K/UL — SIGNIFICANT CHANGE UP (ref 1.2–3.4)
LYMPHOCYTES # BLD AUTO: 13.4 % — LOW (ref 20.5–51.1)
LYMPHOCYTES # BLD AUTO: 13.4 % — LOW (ref 20.5–51.1)
MCHC RBC-ENTMCNC: 30.7 PG — SIGNIFICANT CHANGE UP (ref 27–31)
MCHC RBC-ENTMCNC: 30.7 PG — SIGNIFICANT CHANGE UP (ref 27–31)
MCHC RBC-ENTMCNC: 32.5 G/DL — SIGNIFICANT CHANGE UP (ref 32–37)
MCHC RBC-ENTMCNC: 32.5 G/DL — SIGNIFICANT CHANGE UP (ref 32–37)
MCV RBC AUTO: 94.5 FL — HIGH (ref 80–94)
MCV RBC AUTO: 94.5 FL — HIGH (ref 80–94)
MONOCYTES # BLD AUTO: 1 K/UL — HIGH (ref 0.1–0.6)
MONOCYTES # BLD AUTO: 1 K/UL — HIGH (ref 0.1–0.6)
MONOCYTES NFR BLD AUTO: 9.4 % — HIGH (ref 1.7–9.3)
MONOCYTES NFR BLD AUTO: 9.4 % — HIGH (ref 1.7–9.3)
NEUTROPHILS # BLD AUTO: 8.07 K/UL — HIGH (ref 1.4–6.5)
NEUTROPHILS # BLD AUTO: 8.07 K/UL — HIGH (ref 1.4–6.5)
NEUTROPHILS NFR BLD AUTO: 76.2 % — HIGH (ref 42.2–75.2)
NEUTROPHILS NFR BLD AUTO: 76.2 % — HIGH (ref 42.2–75.2)
NITRITE UR-MCNC: NEGATIVE — SIGNIFICANT CHANGE UP
NITRITE UR-MCNC: NEGATIVE — SIGNIFICANT CHANGE UP
NRBC # BLD: 0 /100 WBCS — SIGNIFICANT CHANGE UP (ref 0–0)
NRBC # BLD: 0 /100 WBCS — SIGNIFICANT CHANGE UP (ref 0–0)
PCO2 BLDV: 42 MMHG — SIGNIFICANT CHANGE UP (ref 42–55)
PCO2 BLDV: 42 MMHG — SIGNIFICANT CHANGE UP (ref 42–55)
PCO2 BLDV: 53 MMHG — SIGNIFICANT CHANGE UP (ref 42–55)
PCO2 BLDV: 53 MMHG — SIGNIFICANT CHANGE UP (ref 42–55)
PH BLDV: 7.36 — SIGNIFICANT CHANGE UP (ref 7.32–7.43)
PH BLDV: 7.36 — SIGNIFICANT CHANGE UP (ref 7.32–7.43)
PH BLDV: 7.4 — SIGNIFICANT CHANGE UP (ref 7.32–7.43)
PH BLDV: 7.4 — SIGNIFICANT CHANGE UP (ref 7.32–7.43)
PH UR: 6.5 — SIGNIFICANT CHANGE UP (ref 5–8)
PH UR: 6.5 — SIGNIFICANT CHANGE UP (ref 5–8)
PLATELET # BLD AUTO: 175 K/UL — SIGNIFICANT CHANGE UP (ref 130–400)
PLATELET # BLD AUTO: 175 K/UL — SIGNIFICANT CHANGE UP (ref 130–400)
PMV BLD: 10.4 FL — SIGNIFICANT CHANGE UP (ref 7.4–10.4)
PMV BLD: 10.4 FL — SIGNIFICANT CHANGE UP (ref 7.4–10.4)
PO2 BLDV: 28 MMHG — SIGNIFICANT CHANGE UP
PO2 BLDV: 28 MMHG — SIGNIFICANT CHANGE UP
PO2 BLDV: 58 MMHG — SIGNIFICANT CHANGE UP
PO2 BLDV: 58 MMHG — SIGNIFICANT CHANGE UP
POTASSIUM BLDV-SCNC: 4.8 MMOL/L — SIGNIFICANT CHANGE UP (ref 3.5–5.1)
POTASSIUM BLDV-SCNC: 4.8 MMOL/L — SIGNIFICANT CHANGE UP (ref 3.5–5.1)
POTASSIUM BLDV-SCNC: 6.1 MMOL/L — HIGH (ref 3.5–5.1)
POTASSIUM BLDV-SCNC: 6.1 MMOL/L — HIGH (ref 3.5–5.1)
POTASSIUM SERPL-MCNC: 5.3 MMOL/L — HIGH (ref 3.5–5)
POTASSIUM SERPL-MCNC: 5.3 MMOL/L — HIGH (ref 3.5–5)
POTASSIUM SERPL-MCNC: 5.7 MMOL/L — HIGH (ref 3.5–5)
POTASSIUM SERPL-MCNC: 5.7 MMOL/L — HIGH (ref 3.5–5)
POTASSIUM SERPL-SCNC: 5.3 MMOL/L — HIGH (ref 3.5–5)
POTASSIUM SERPL-SCNC: 5.3 MMOL/L — HIGH (ref 3.5–5)
POTASSIUM SERPL-SCNC: 5.7 MMOL/L — HIGH (ref 3.5–5)
POTASSIUM SERPL-SCNC: 5.7 MMOL/L — HIGH (ref 3.5–5)
PROT SERPL-MCNC: 7.1 G/DL — SIGNIFICANT CHANGE UP (ref 6–8)
PROT SERPL-MCNC: 7.1 G/DL — SIGNIFICANT CHANGE UP (ref 6–8)
PROT UR-MCNC: 30 MG/DL
PROT UR-MCNC: 30 MG/DL
PROTHROM AB SERPL-ACNC: 14.7 SEC — HIGH (ref 9.95–12.87)
PROTHROM AB SERPL-ACNC: 14.7 SEC — HIGH (ref 9.95–12.87)
RBC # BLD: 4.92 M/UL — SIGNIFICANT CHANGE UP (ref 4.7–6.1)
RBC # BLD: 4.92 M/UL — SIGNIFICANT CHANGE UP (ref 4.7–6.1)
RBC # FLD: 11.9 % — SIGNIFICANT CHANGE UP (ref 11.5–14.5)
RBC # FLD: 11.9 % — SIGNIFICANT CHANGE UP (ref 11.5–14.5)
RBC CASTS # UR COMP ASSIST: 2 /HPF — SIGNIFICANT CHANGE UP (ref 0–4)
RBC CASTS # UR COMP ASSIST: 2 /HPF — SIGNIFICANT CHANGE UP (ref 0–4)
SAO2 % BLDV: 38 % — SIGNIFICANT CHANGE UP
SAO2 % BLDV: 38 % — SIGNIFICANT CHANGE UP
SAO2 % BLDV: 88.9 % — SIGNIFICANT CHANGE UP
SAO2 % BLDV: 88.9 % — SIGNIFICANT CHANGE UP
SODIUM SERPL-SCNC: 137 MMOL/L — SIGNIFICANT CHANGE UP (ref 135–146)
SP GR SPEC: 1.03 — SIGNIFICANT CHANGE UP (ref 1–1.03)
SP GR SPEC: 1.03 — SIGNIFICANT CHANGE UP (ref 1–1.03)
SQUAMOUS # UR AUTO: 0 /HPF — SIGNIFICANT CHANGE UP (ref 0–5)
SQUAMOUS # UR AUTO: 0 /HPF — SIGNIFICANT CHANGE UP (ref 0–5)
UROBILINOGEN FLD QL: 1 MG/DL — SIGNIFICANT CHANGE UP (ref 0.2–1)
UROBILINOGEN FLD QL: 1 MG/DL — SIGNIFICANT CHANGE UP (ref 0.2–1)
WBC # BLD: 10.59 K/UL — SIGNIFICANT CHANGE UP (ref 4.8–10.8)
WBC # BLD: 10.59 K/UL — SIGNIFICANT CHANGE UP (ref 4.8–10.8)
WBC # FLD AUTO: 10.59 K/UL — SIGNIFICANT CHANGE UP (ref 4.8–10.8)
WBC # FLD AUTO: 10.59 K/UL — SIGNIFICANT CHANGE UP (ref 4.8–10.8)
WBC UR QL: 0 /HPF — SIGNIFICANT CHANGE UP (ref 0–5)
WBC UR QL: 0 /HPF — SIGNIFICANT CHANGE UP (ref 0–5)

## 2023-12-01 PROCEDURE — 93010 ELECTROCARDIOGRAM REPORT: CPT

## 2023-12-01 PROCEDURE — C1758: CPT

## 2023-12-01 PROCEDURE — 93005 ELECTROCARDIOGRAM TRACING: CPT

## 2023-12-01 PROCEDURE — 85025 COMPLETE CBC W/AUTO DIFF WBC: CPT

## 2023-12-01 PROCEDURE — 85610 PROTHROMBIN TIME: CPT

## 2023-12-01 PROCEDURE — 87086 URINE CULTURE/COLONY COUNT: CPT

## 2023-12-01 PROCEDURE — 74018 RADEX ABDOMEN 1 VIEW: CPT

## 2023-12-01 PROCEDURE — 85730 THROMBOPLASTIN TIME PARTIAL: CPT

## 2023-12-01 PROCEDURE — C1769: CPT

## 2023-12-01 PROCEDURE — 36415 COLL VENOUS BLD VENIPUNCTURE: CPT

## 2023-12-01 PROCEDURE — 99223 1ST HOSP IP/OBS HIGH 75: CPT | Mod: 25

## 2023-12-01 PROCEDURE — 80048 BASIC METABOLIC PNL TOTAL CA: CPT

## 2023-12-01 PROCEDURE — C2617: CPT

## 2023-12-01 PROCEDURE — 52332 CYSTOSCOPY AND TREATMENT: CPT | Mod: RT

## 2023-12-01 PROCEDURE — 74177 CT ABD & PELVIS W/CONTRAST: CPT | Mod: 26,MA

## 2023-12-01 PROCEDURE — 99285 EMERGENCY DEPT VISIT HI MDM: CPT

## 2023-12-01 RX ORDER — ACETAMINOPHEN 500 MG
650 TABLET ORAL EVERY 6 HOURS
Refills: 0 | Status: DISCONTINUED | OUTPATIENT
Start: 2023-12-01 | End: 2023-12-01

## 2023-12-01 RX ORDER — ONDANSETRON 8 MG/1
4 TABLET, FILM COATED ORAL EVERY 4 HOURS
Refills: 0 | Status: DISCONTINUED | OUTPATIENT
Start: 2023-12-01 | End: 2023-12-01

## 2023-12-01 RX ORDER — SODIUM CHLORIDE 9 MG/ML
1000 INJECTION INTRAMUSCULAR; INTRAVENOUS; SUBCUTANEOUS
Refills: 0 | Status: DISCONTINUED | OUTPATIENT
Start: 2023-12-01 | End: 2023-12-02

## 2023-12-01 RX ORDER — HYDROMORPHONE HYDROCHLORIDE 2 MG/ML
0.5 INJECTION INTRAMUSCULAR; INTRAVENOUS; SUBCUTANEOUS
Refills: 0 | Status: DISCONTINUED | OUTPATIENT
Start: 2023-12-01 | End: 2023-12-02

## 2023-12-01 RX ORDER — ACETAMINOPHEN 500 MG
650 TABLET ORAL EVERY 6 HOURS
Refills: 0 | Status: DISCONTINUED | OUTPATIENT
Start: 2023-12-01 | End: 2023-12-02

## 2023-12-01 RX ORDER — KETOROLAC TROMETHAMINE 30 MG/ML
15 SYRINGE (ML) INJECTION EVERY 8 HOURS
Refills: 0 | Status: DISCONTINUED | OUTPATIENT
Start: 2023-12-01 | End: 2023-12-01

## 2023-12-01 RX ORDER — KETOROLAC TROMETHAMINE 30 MG/ML
15 SYRINGE (ML) INJECTION EVERY 8 HOURS
Refills: 0 | Status: DISCONTINUED | OUTPATIENT
Start: 2023-12-01 | End: 2023-12-02

## 2023-12-01 RX ORDER — TAMSULOSIN HYDROCHLORIDE 0.4 MG/1
0.4 CAPSULE ORAL AT BEDTIME
Refills: 0 | Status: DISCONTINUED | OUTPATIENT
Start: 2023-12-01 | End: 2023-12-02

## 2023-12-01 RX ORDER — ONDANSETRON 8 MG/1
4 TABLET, FILM COATED ORAL EVERY 4 HOURS
Refills: 0 | Status: DISCONTINUED | OUTPATIENT
Start: 2023-12-01 | End: 2023-12-02

## 2023-12-01 RX ORDER — CEFTRIAXONE 500 MG/1
2000 INJECTION, POWDER, FOR SOLUTION INTRAMUSCULAR; INTRAVENOUS EVERY 24 HOURS
Refills: 0 | Status: DISCONTINUED | OUTPATIENT
Start: 2023-12-01 | End: 2023-12-01

## 2023-12-01 RX ORDER — SODIUM CHLORIDE 9 MG/ML
1000 INJECTION, SOLUTION INTRAVENOUS
Refills: 0 | Status: DISCONTINUED | OUTPATIENT
Start: 2023-12-01 | End: 2023-12-02

## 2023-12-01 RX ORDER — SODIUM CHLORIDE 9 MG/ML
1000 INJECTION INTRAMUSCULAR; INTRAVENOUS; SUBCUTANEOUS ONCE
Refills: 0 | Status: COMPLETED | OUTPATIENT
Start: 2023-12-01 | End: 2023-12-01

## 2023-12-01 RX ORDER — SODIUM CHLORIDE 9 MG/ML
1000 INJECTION, SOLUTION INTRAVENOUS
Refills: 0 | Status: DISCONTINUED | OUTPATIENT
Start: 2023-12-01 | End: 2023-12-01

## 2023-12-01 RX ORDER — CEFTRIAXONE 500 MG/1
1000 INJECTION, POWDER, FOR SOLUTION INTRAMUSCULAR; INTRAVENOUS EVERY 24 HOURS
Refills: 0 | Status: DISCONTINUED | OUTPATIENT
Start: 2023-12-01 | End: 2023-12-02

## 2023-12-01 RX ORDER — TAMSULOSIN HYDROCHLORIDE 0.4 MG/1
0.4 CAPSULE ORAL AT BEDTIME
Refills: 0 | Status: DISCONTINUED | OUTPATIENT
Start: 2023-12-01 | End: 2023-12-01

## 2023-12-01 RX ORDER — ONDANSETRON 8 MG/1
4 TABLET, FILM COATED ORAL ONCE
Refills: 0 | Status: DISCONTINUED | OUTPATIENT
Start: 2023-12-01 | End: 2023-12-02

## 2023-12-01 RX ADMIN — Medication 650 MILLIGRAM(S): at 18:14

## 2023-12-01 RX ADMIN — SODIUM CHLORIDE 1000 MILLILITER(S): 9 INJECTION INTRAMUSCULAR; INTRAVENOUS; SUBCUTANEOUS at 11:08

## 2023-12-01 RX ADMIN — Medication 650 MILLIGRAM(S): at 18:15

## 2023-12-01 RX ADMIN — Medication 15 MILLIGRAM(S): at 18:15

## 2023-12-01 RX ADMIN — TAMSULOSIN HYDROCHLORIDE 0.4 MILLIGRAM(S): 0.4 CAPSULE ORAL at 18:16

## 2023-12-01 RX ADMIN — Medication 15 MILLIGRAM(S): at 18:14

## 2023-12-01 RX ADMIN — CEFTRIAXONE 100 MILLIGRAM(S): 500 INJECTION, POWDER, FOR SOLUTION INTRAMUSCULAR; INTRAVENOUS at 18:03

## 2023-12-01 RX ADMIN — SODIUM CHLORIDE 1000 MILLILITER(S): 9 INJECTION INTRAMUSCULAR; INTRAVENOUS; SUBCUTANEOUS at 07:05

## 2023-12-01 NOTE — BRIEF OPERATIVE NOTE - OPERATION/FINDINGS
right ureteral stone obstructing causing sepsis. right ureteral stent placed. culture sent from right kidney.  6 x 24 right ureteral stent  aware that needs to follow up to break stone and remove stent

## 2023-12-01 NOTE — ED PROVIDER NOTE - DISPOSITION TYPE
Detail Level: Generalized
General Sunscreen Counseling: I recommended a broad spectrum sunscreen with a SPF of 30 or higher.  I explained that SPF 30 sunscreens block approximately 97 percent of the sun's harmful rays.  Sunscreens should be applied at least 15 minutes prior to expected sun exposure and then every 2 hours after that as long as sun exposure continues. If swimming or exercising sunscreen should be reapplied every 2 hours (minimum)  getting wet or sweating.  One ounce, or the equivalent of a shot glass full of sunscreen, is adequate to protect the skin not covered by a bathing suit. I also recommended a lip balm with a sunscreen as well. Sun protective clothing can be used in lieu of sunscreen but must be worn the entire time you are exposed to the sun's rays.
ADMIT

## 2023-12-01 NOTE — ED ADULT NURSE NOTE - OBJECTIVE STATEMENT
59 yo pt having abd pain and Nausea for 4x days. Pt also has right mendable swelling. Pt stated he was on amoxacillin.

## 2023-12-01 NOTE — ED ADULT TRIAGE NOTE - CHIEF COMPLAINT QUOTE
pt complains abdominal pain with nausea x4 days states that he thinks it his kidney stones that he has had before

## 2023-12-01 NOTE — ED PROVIDER NOTE - CONSIDERATION OF ADMISSION OBSERVATION
Consideration of Admission/Observation Patient with a kidney stone and impaired renal function, needs admission

## 2023-12-01 NOTE — ED PROVIDER NOTE - CLINICAL SUMMARY MEDICAL DECISION MAKING FREE TEXT BOX
58-year-old male with left flank pain secondary to proximal ureteral stone, impaired renal function as seen on labs.  Vital signs reviewed, he is afebrile and overall appears well.  Management discussed with urology, patient to be admitted for intervention in the OR.

## 2023-12-01 NOTE — H&P ADULT - NSHPPHYSICALEXAM_GEN_ALL_CORE
Vital Signs Last 24 Hrs  T(C): 38.4 (01 Dec 2023 15:55), Max: 38.4 (01 Dec 2023 15:55)  T(F): 101.2 (01 Dec 2023 15:55), Max: 101.2 (01 Dec 2023 15:55)  HR: 92 (01 Dec 2023 15:55) (78 - 92)  BP: 129/60 (01 Dec 2023 15:55) (129/60 - 161/81)  RR: 20 (01 Dec 2023 15:55) (17 - 20)  SpO2: 97% (01 Dec 2023 15:55) (97% - 99%)    Parameters below as of 01 Dec 2023 15:55  Patient On (Oxygen Delivery Method): room air    PHYSICAL EXAM:  GEN: Ill appearing male in no acute distress. Awake and alert.  SKIN: Non diaphoretic.  RESP: No dyspnea, non-labored breathing. No use of accessory muscles.  CARDIO: +S1/S2  ABDO: Soft, NT/ND, no palpable bladder, no suprapubic tenderness.  BACK: No CVAT B/L.  : Voiding.

## 2023-12-01 NOTE — ED PROVIDER NOTE - OBJECTIVE STATEMENT
58-year-old male with history of HTN and kidney stones presents for evaluation of several days of nonradiating mid abdominal pain.  Patient reports associated nausea without vomiting.  Denies dysuria, hematuria, fever.

## 2023-12-01 NOTE — PROGRESS NOTE ADULT - REASON FOR ADMISSION
mild right hydro 2/2 a [id number] proximal stone mild right hydro 2/2 a [id number] proximal stone mild right hydro 2/2 a 3y3h3rr proximal stone

## 2023-12-01 NOTE — H&P ADULT - ASSESSMENT
Patient is a 58yoM with a PMH including HTN & nephrolithiasis who presented to the hospital with nausea and subjective fever x 4 days found to have mild right hydro 2/2 a [id number] proximal stone. Of note while in ED, pt spiked fever to 101.2F.    PLAN:  -Admit to urology, Dr. Clayton  -NPO, IVFs  -Added on for level one cysto, right JJ stent placement  -Flomax  -Preop labs  -Pain & nausea control  -Discussed with Dr. Clayton Patient is a 58yoM with a PMH including HTN & nephrolithiasis who presented to the hospital with nausea and subjective fever x 4 days found to have mild right hydro 2/2 a 2i8o2ia proximal stone. Of note while in ED, pt spiked fever to 101.2F.    PLAN:  -Admit to urology, Dr. Clayton  -NPO, IVFs  -Added on for level one cysto, right JJ stent placement  -Flomax  -UA (-), f/u UCx, send BCx  -Rocephin  -Preop labs  -Pain & nausea control  -Discussed with Dr. Clayton

## 2023-12-01 NOTE — PROGRESS NOTE ADULT - SUBJECTIVE AND OBJECTIVE BOX
UROLOGY PROGRESS NOTE    Pt is a 58y M POD#0 s/p cystoscopy, R ureteral stent placement. Pt seen and examined at bedside in the RR. Pt offering no complaints at this time. Denies suprapubic pain, b/l flank pain, N/V, difficulty breathing.     MEDICATIONS  (STANDING):  cefTRIAXone   IVPB 1000 milliGRAM(s) IV Intermittent every 24 hours  lactated ringers. 1000 milliLiter(s) (75 mL/Hr) IV Continuous <Continuous>  sodium chloride 0.9%. 1000 milliLiter(s) (100 mL/Hr) IV Continuous <Continuous>  tamsulosin 0.4 milliGRAM(s) Oral at bedtime    MEDICATIONS  (PRN):  acetaminophen     Tablet .. 650 milliGRAM(s) Oral every 6 hours PRN Temp greater or equal to 38C (100.4F), Mild Pain (1 - 3)  HYDROmorphone  Injectable 0.5 milliGRAM(s) IV Push every 10 minutes PRN Moderate Pain (4 - 6)  ketorolac   Injectable 15 milliGRAM(s) IV Push every 8 hours PRN Moderate Pain (4 - 6)  ondansetron Injectable 4 milliGRAM(s) IV Push once PRN Nausea and/or Vomiting  ondansetron Injectable 4 milliGRAM(s) IV Push every 4 hours PRN Nausea and/or Vomiting    REVIEW OF SYSTEMS   [x] A ten-point review of systems was otherwise negative except as noted.    Vital Signs Last 24 Hrs  T(C): 36.8 (01 Dec 2023 20:50), Max: 38.7 (01 Dec 2023 17:36)  T(F): 98.2 (01 Dec 2023 20:50), Max: 101.7 (01 Dec 2023 17:36)  HR: 71 (01 Dec 2023 20:35) (71 - 92)  BP: 113/64 (01 Dec 2023 20:35) (113/64 - 161/81)  BP(mean): 83 (01 Dec 2023 20:35) (83 - 89)  RR: 14 (01 Dec 2023 20:35) (14 - 20)  SpO2: 97% (01 Dec 2023 20:35) (96% - 99%)    Parameters below as of 01 Dec 2023 20:35  Patient On (Oxygen Delivery Method): nasal cannula  O2 Flow (L/min): 2      PHYSICAL EXAM:  GEN: NAD  NEURO: Awake and alert  SKIN: Good color, non diaphoretic  RESP: Non-labored breathing  ABDO: Soft, NT/ND  BACK: No CVAT B/L   EXT: ALLEN x 4

## 2023-12-01 NOTE — H&P ADULT - NS ATTEND AMEND GEN_ALL_CORE FT
pt seen at bedside  agree with above  right ureteral obstructing stone right hydronephrosis.  Had 101 this afternoon, has chills as well.  signs of sepsis. will proceed with emergent right ureteral stent placement  aware that stent has to be removed within a few weeks  stone will be treated as outpatient once the infection has resolved

## 2023-12-01 NOTE — BRIEF OPERATIVE NOTE - NSICDXBRIEFPREOP_GEN_ALL_CORE_FT
PRE-OP DIAGNOSIS:  Right ureteral stone 01-Dec-2023 20:12:35  Doni Clayton  Sepsis 01-Dec-2023 20:12:42  Doni Clayton  Hydronephrosis, right 01-Dec-2023 20:12:51  Doni Clayton

## 2023-12-01 NOTE — ED PROVIDER NOTE - PROGRESS NOTE DETAILS
Robb: Endorsed to Dr. Shafer EP: Case discussed with the urology service, they recommended another liter of fluid and repeat of creatinine.  They will evaluate patient at bedside. EP: Patient appears well, still declining analgesia in ED.  UA is without infection.  Labs show creatinine of 2.3, baseline 1.2 as of 2019.  CT shows 6 x 5 x 6 mm right proximal calculus.  Urology was consulted. EP: Case discussed with urology again, patient to be admitted for possible or intervention.

## 2023-12-01 NOTE — H&P ADULT - NSHPLABSRESULTS_GEN_ALL_CORE
LABS:             15.1   10.59 )-----------( 175      ( 12-01 @ 04:55 )             46.5                  137   |  103   |  16                 Ca: 8.2    BMP:   ----------------------------< 110    Mg: x     (12-01-23 @ 12:30)             5.3    |  23    | 2.2                Ph: x        LFT:     TPro: 7.1 / Alb: 3.8 / TBili: 0.4 / DBili: x / AST: 27 / ALT: 8 / AlkPhos: 55   (12-01-23 @ 04:55)    Urinalysis (12.01.23 @ 07:40)   Glucose Qualitative, Urine: Negative mg/dL  Blood, Urine: Non Hemolyzed Trace  pH Urine: 6.5  Color: Yellow  Urine Appearance: Clear  Bilirubin: Negative  Ketone - Urine: 15 mg/dL  Specific Gravity: 1.027  Protein, Urine: 30 mg/dL  Urobilinogen: 1.0 mg/dL  Nitrite: Negative  Leukocyte Esterase Concentration: Negative      IMAGING:  < from: CT Abdomen and Pelvis w/ IV Cont (12.01.23 @ 07:53) >    FINDINGS:    LOWER CHEST: Unremarkable.    HEPATOBILIARY: Unremarkable.    SPLEEN: Unremarkable.    PANCREAS: Unremarkable.    ADRENAL GLANDS: Unremarkable.    KIDNEYS: Mild right hydronephrosis with a 0.6 x 0.5 x 0.6 cm right   proximal ureter calculus. Punctate nonobstructingbilateral renal   calculi. Right renal cyst.    ABDOMINOPELVIC NODES: Unremarkable.    PELVIC ORGANS: Unremarkable.    PERITONEUM/MESENTERY/BOWEL: Unremarkable.    BONES/SOFT TISSUES: Degenerative change, lumbar spine.    IMPRESSION:    Mild right hydronephrosis with a 0.6 x 0.5 x 0.6 cm right proximal ureter   calculus.  < end of copied text >

## 2023-12-01 NOTE — ED PROVIDER NOTE - PHYSICAL EXAMINATION
CONSTITUTIONAL: Well-developed; well-nourished; in no acute distress, nontoxic appearing  SKIN: skin exam is warm and dry,  HEAD: Normocephalic; atraumatic.  EYES: PERRL, 3 mm bilateral, no nystagmus, EOM intact; conjunctiva and sclera clear.  ENT: MMM, no nasal congestion  NECK: Supple; non tender.+ full passive ROM in all directions. No JVD  CARD: S1, S2 normal, no murmur  RESP: No wheezes, rales or rhonchi. Good air movement bilaterally  ABD: soft; non-distended; + ttp of the ant abdomen. No Rebound, No guarding  EXT: Normal ROM. No cyanosis or edema. Dp Pulses intact.   NEURO: awake, alert, following commands, oriented, grossly unremarkable. No Focal deficits. GCS 15.   PSYCH: Cooperative, appropriate.

## 2023-12-01 NOTE — BRIEF OPERATIVE NOTE - NSICDXBRIEFPOSTOP_GEN_ALL_CORE_FT
POST-OP DIAGNOSIS:  Right ureteral stone 01-Dec-2023 20:13:01  Doni Clayton  Sepsis 01-Dec-2023 20:13:30  Doni Clayton  Hydronephrosis, right 01-Dec-2023 20:13:41  Doni Clayton

## 2023-12-01 NOTE — H&P ADULT - HISTORY OF PRESENT ILLNESS
Patient is a 58yoM with a PMH including HTN & nephrolithiasis (previously required a stent, done by Dr. Pisano ~3years ago, now follows with Dr. Martinez) who presented to the hospital with nausea and subjective fever x 4 days. Patient reports mild right-sided nonradiating mid abdominal pain which improved with Tylenol at home. Denies chills, chest pain, SOB, urinary symptoms.

## 2023-12-02 ENCOUNTER — TRANSCRIPTION ENCOUNTER (OUTPATIENT)
Age: 58
End: 2023-12-02

## 2023-12-02 VITALS
DIASTOLIC BLOOD PRESSURE: 69 MMHG | TEMPERATURE: 98 F | HEART RATE: 75 BPM | SYSTOLIC BLOOD PRESSURE: 136 MMHG | RESPIRATION RATE: 18 BRPM

## 2023-12-02 LAB
ANION GAP SERPL CALC-SCNC: 10 MMOL/L — SIGNIFICANT CHANGE UP (ref 7–14)
ANION GAP SERPL CALC-SCNC: 10 MMOL/L — SIGNIFICANT CHANGE UP (ref 7–14)
ANION GAP SERPL CALC-SCNC: 8 MMOL/L — SIGNIFICANT CHANGE UP (ref 7–14)
ANION GAP SERPL CALC-SCNC: 8 MMOL/L — SIGNIFICANT CHANGE UP (ref 7–14)
BASOPHILS # BLD AUTO: 0.02 K/UL — SIGNIFICANT CHANGE UP (ref 0–0.2)
BASOPHILS # BLD AUTO: 0.02 K/UL — SIGNIFICANT CHANGE UP (ref 0–0.2)
BASOPHILS NFR BLD AUTO: 0.2 % — SIGNIFICANT CHANGE UP (ref 0–1)
BASOPHILS NFR BLD AUTO: 0.2 % — SIGNIFICANT CHANGE UP (ref 0–1)
BUN SERPL-MCNC: 21 MG/DL — HIGH (ref 10–20)
BUN SERPL-MCNC: 21 MG/DL — HIGH (ref 10–20)
BUN SERPL-MCNC: 25 MG/DL — HIGH (ref 10–20)
BUN SERPL-MCNC: 25 MG/DL — HIGH (ref 10–20)
CALCIUM SERPL-MCNC: 8 MG/DL — LOW (ref 8.4–10.5)
CALCIUM SERPL-MCNC: 8 MG/DL — LOW (ref 8.4–10.5)
CALCIUM SERPL-MCNC: 8.9 MG/DL — SIGNIFICANT CHANGE UP (ref 8.4–10.4)
CALCIUM SERPL-MCNC: 8.9 MG/DL — SIGNIFICANT CHANGE UP (ref 8.4–10.4)
CHLORIDE SERPL-SCNC: 101 MMOL/L — SIGNIFICANT CHANGE UP (ref 98–110)
CHLORIDE SERPL-SCNC: 101 MMOL/L — SIGNIFICANT CHANGE UP (ref 98–110)
CHLORIDE SERPL-SCNC: 105 MMOL/L — SIGNIFICANT CHANGE UP (ref 98–110)
CHLORIDE SERPL-SCNC: 105 MMOL/L — SIGNIFICANT CHANGE UP (ref 98–110)
CO2 SERPL-SCNC: 25 MMOL/L — SIGNIFICANT CHANGE UP (ref 17–32)
CO2 SERPL-SCNC: 25 MMOL/L — SIGNIFICANT CHANGE UP (ref 17–32)
CO2 SERPL-SCNC: 29 MMOL/L — SIGNIFICANT CHANGE UP (ref 17–32)
CO2 SERPL-SCNC: 29 MMOL/L — SIGNIFICANT CHANGE UP (ref 17–32)
CREAT SERPL-MCNC: 1.6 MG/DL — HIGH (ref 0.7–1.5)
CREAT SERPL-MCNC: 1.6 MG/DL — HIGH (ref 0.7–1.5)
CREAT SERPL-MCNC: 1.7 MG/DL — HIGH (ref 0.7–1.5)
CREAT SERPL-MCNC: 1.7 MG/DL — HIGH (ref 0.7–1.5)
CULTURE RESULTS: NO GROWTH — SIGNIFICANT CHANGE UP
CULTURE RESULTS: NO GROWTH — SIGNIFICANT CHANGE UP
EGFR: 47 ML/MIN/1.73M2 — LOW
EGFR: 47 ML/MIN/1.73M2 — LOW
EGFR: 50 ML/MIN/1.73M2 — LOW
EGFR: 50 ML/MIN/1.73M2 — LOW
EOSINOPHIL # BLD AUTO: 0 K/UL — SIGNIFICANT CHANGE UP (ref 0–0.7)
EOSINOPHIL # BLD AUTO: 0 K/UL — SIGNIFICANT CHANGE UP (ref 0–0.7)
EOSINOPHIL NFR BLD AUTO: 0 % — SIGNIFICANT CHANGE UP (ref 0–8)
EOSINOPHIL NFR BLD AUTO: 0 % — SIGNIFICANT CHANGE UP (ref 0–8)
GLUCOSE SERPL-MCNC: 109 MG/DL — HIGH (ref 70–99)
GLUCOSE SERPL-MCNC: 109 MG/DL — HIGH (ref 70–99)
GLUCOSE SERPL-MCNC: 139 MG/DL — HIGH (ref 70–99)
GLUCOSE SERPL-MCNC: 139 MG/DL — HIGH (ref 70–99)
HCT VFR BLD CALC: 41.1 % — LOW (ref 42–52)
HCT VFR BLD CALC: 41.1 % — LOW (ref 42–52)
HGB BLD-MCNC: 13.5 G/DL — LOW (ref 14–18)
HGB BLD-MCNC: 13.5 G/DL — LOW (ref 14–18)
IMM GRANULOCYTES NFR BLD AUTO: 0.5 % — HIGH (ref 0.1–0.3)
IMM GRANULOCYTES NFR BLD AUTO: 0.5 % — HIGH (ref 0.1–0.3)
LYMPHOCYTES # BLD AUTO: 1.05 K/UL — LOW (ref 1.2–3.4)
LYMPHOCYTES # BLD AUTO: 1.05 K/UL — LOW (ref 1.2–3.4)
LYMPHOCYTES # BLD AUTO: 10.8 % — LOW (ref 20.5–51.1)
LYMPHOCYTES # BLD AUTO: 10.8 % — LOW (ref 20.5–51.1)
MCHC RBC-ENTMCNC: 30.8 PG — SIGNIFICANT CHANGE UP (ref 27–31)
MCHC RBC-ENTMCNC: 30.8 PG — SIGNIFICANT CHANGE UP (ref 27–31)
MCHC RBC-ENTMCNC: 32.8 G/DL — SIGNIFICANT CHANGE UP (ref 32–37)
MCHC RBC-ENTMCNC: 32.8 G/DL — SIGNIFICANT CHANGE UP (ref 32–37)
MCV RBC AUTO: 93.8 FL — SIGNIFICANT CHANGE UP (ref 80–94)
MCV RBC AUTO: 93.8 FL — SIGNIFICANT CHANGE UP (ref 80–94)
MONOCYTES # BLD AUTO: 0.52 K/UL — SIGNIFICANT CHANGE UP (ref 0.1–0.6)
MONOCYTES # BLD AUTO: 0.52 K/UL — SIGNIFICANT CHANGE UP (ref 0.1–0.6)
MONOCYTES NFR BLD AUTO: 5.3 % — SIGNIFICANT CHANGE UP (ref 1.7–9.3)
MONOCYTES NFR BLD AUTO: 5.3 % — SIGNIFICANT CHANGE UP (ref 1.7–9.3)
NEUTROPHILS # BLD AUTO: 8.09 K/UL — HIGH (ref 1.4–6.5)
NEUTROPHILS # BLD AUTO: 8.09 K/UL — HIGH (ref 1.4–6.5)
NEUTROPHILS NFR BLD AUTO: 83.2 % — HIGH (ref 42.2–75.2)
NEUTROPHILS NFR BLD AUTO: 83.2 % — HIGH (ref 42.2–75.2)
NRBC # BLD: 0 /100 WBCS — SIGNIFICANT CHANGE UP (ref 0–0)
NRBC # BLD: 0 /100 WBCS — SIGNIFICANT CHANGE UP (ref 0–0)
PLATELET # BLD AUTO: 162 K/UL — SIGNIFICANT CHANGE UP (ref 130–400)
PLATELET # BLD AUTO: 162 K/UL — SIGNIFICANT CHANGE UP (ref 130–400)
PMV BLD: 10.3 FL — SIGNIFICANT CHANGE UP (ref 7.4–10.4)
PMV BLD: 10.3 FL — SIGNIFICANT CHANGE UP (ref 7.4–10.4)
POTASSIUM SERPL-MCNC: 4.9 MMOL/L — SIGNIFICANT CHANGE UP (ref 3.5–5)
POTASSIUM SERPL-MCNC: 4.9 MMOL/L — SIGNIFICANT CHANGE UP (ref 3.5–5)
POTASSIUM SERPL-MCNC: 5.1 MMOL/L — HIGH (ref 3.5–5)
POTASSIUM SERPL-MCNC: 5.1 MMOL/L — HIGH (ref 3.5–5)
POTASSIUM SERPL-SCNC: 4.9 MMOL/L — SIGNIFICANT CHANGE UP (ref 3.5–5)
POTASSIUM SERPL-SCNC: 4.9 MMOL/L — SIGNIFICANT CHANGE UP (ref 3.5–5)
POTASSIUM SERPL-SCNC: 5.1 MMOL/L — HIGH (ref 3.5–5)
POTASSIUM SERPL-SCNC: 5.1 MMOL/L — HIGH (ref 3.5–5)
RBC # BLD: 4.38 M/UL — LOW (ref 4.7–6.1)
RBC # BLD: 4.38 M/UL — LOW (ref 4.7–6.1)
RBC # FLD: 11.9 % — SIGNIFICANT CHANGE UP (ref 11.5–14.5)
RBC # FLD: 11.9 % — SIGNIFICANT CHANGE UP (ref 11.5–14.5)
SODIUM SERPL-SCNC: 138 MMOL/L — SIGNIFICANT CHANGE UP (ref 135–146)
SODIUM SERPL-SCNC: 138 MMOL/L — SIGNIFICANT CHANGE UP (ref 135–146)
SODIUM SERPL-SCNC: 140 MMOL/L — SIGNIFICANT CHANGE UP (ref 135–146)
SODIUM SERPL-SCNC: 140 MMOL/L — SIGNIFICANT CHANGE UP (ref 135–146)
SPECIMEN SOURCE: SIGNIFICANT CHANGE UP
SPECIMEN SOURCE: SIGNIFICANT CHANGE UP
WBC # BLD: 9.73 K/UL — SIGNIFICANT CHANGE UP (ref 4.8–10.8)
WBC # BLD: 9.73 K/UL — SIGNIFICANT CHANGE UP (ref 4.8–10.8)
WBC # FLD AUTO: 9.73 K/UL — SIGNIFICANT CHANGE UP (ref 4.8–10.8)
WBC # FLD AUTO: 9.73 K/UL — SIGNIFICANT CHANGE UP (ref 4.8–10.8)

## 2023-12-02 RX ORDER — CEFTRIAXONE 500 MG/1
1000 INJECTION, POWDER, FOR SOLUTION INTRAMUSCULAR; INTRAVENOUS EVERY 24 HOURS
Refills: 0 | Status: DISCONTINUED | OUTPATIENT
Start: 2023-12-02 | End: 2023-12-02

## 2023-12-02 RX ORDER — CEFPODOXIME PROXETIL 100 MG
1 TABLET ORAL
Qty: 14 | Refills: 0
Start: 2023-12-02 | End: 2023-12-08

## 2023-12-02 RX ADMIN — CEFTRIAXONE 100 MILLIGRAM(S): 500 INJECTION, POWDER, FOR SOLUTION INTRAMUSCULAR; INTRAVENOUS at 13:07

## 2023-12-02 NOTE — DISCHARGE NOTE PROVIDER - HOSPITAL COURSE
Pt is a 57y/o M who presented to the hospital with nausea and weakness, was found to have a 6mm RIGHT proximal ureteral stone causing mild hydronephrosis. He spiked a fever to 101F and was taken to the OR emergently for cystoscopy, placement of RIGHT JJ stent. He was monitored on the medical floor overnight and remained stable. He was discharged manda in stable condition on POD # 1. Pt is a 59y/o M who presented to the hospital with nausea and weakness, was found to have a 6mm RIGHT proximal ureteral stone causing mild hydronephrosis. He spiked a fever to 101F and was taken to the OR emergently for cystoscopy, placement of RIGHT JJ stent. He was monitored on the medical floor overnight and remained stable. He was discharged manda in stable condition on POD # 1.

## 2023-12-02 NOTE — PROGRESS NOTE ADULT - ASSESSMENT
57 y/o M s/p cystoscopy, placement of RIGHT JJ stent POD # 1.    - No further inpatient urologic intervention at this time   - Continue IV Abx - Rocephin for now  - OOB-chair, ambulate as tolerated   - Regular diet   - Pt understands this stent is not permanent and must be removed, risks of retained stent include further sepsis, encrustation requiring further procedures, death, etc. Pt will follow up with Dr. Martinez/Forrest as outpatient for further management.   - Anticipate discharge home later this evening or tomorrow  - Will d/w attending . 
Pt is a 58y M POD#0 s/p cystoscopy, R ureteral stent placement.     ·	f/u AM labs   ·	Cont abx-- Ceftriaxone for now   ·	f/u intraop cultures, urine culture   ·	Advance diet as tolerated  ·	Incentive spirometer- pt instructed how to use   ·	OOB/Ambulate as tolerated   ·	Pt aware he will need to followup for definitive stone management, stent removal/exchange

## 2023-12-02 NOTE — DISCHARGE NOTE PROVIDER - REASON FOR ADMISSION
mild right hydro 2/2 a 3x1d0ck proximal stone mild right hydro 2/2 a 9g3i0tg proximal stone mild right hydro 2/2 a 4y9i7dx proximal stone

## 2023-12-02 NOTE — PROGRESS NOTE ADULT - REASON FOR ADMISSION
mild right hydro 2/2 a 2i0s8ho proximal stone mild right hydro 2/2 a 2f9d6wd proximal stone mild right hydro 2/2 a 1t0o2ti proximal stone

## 2023-12-02 NOTE — DISCHARGE NOTE PROVIDER - NSDCFUADDINST_GEN_ALL_CORE_FT
Return to ED if you experience a fever > 101.4F, intractable pain or vomiting, or inability to keep food down.

## 2023-12-02 NOTE — DISCHARGE NOTE PROVIDER - CARE PROVIDERS DIRECT ADDRESSES
[email or web address]

## 2023-12-02 NOTE — DISCHARGE NOTE PROVIDER - NSCORESITESY/N_GEN_A_CORE_RD
Patient Name: Otilia Goel  Procedure Date: 10/31/2017 7:15 AM  MRN: [id number]  Account Number: [id number]  YOB: 1991  Admit Type: Outpatient  Age: 26  Gender: Female  Race:  or [place] Native  Attending MD: Timmy Krishna MD  Grafts or Implants: Grafts or Implants Not Applicable  Procedure:            Colonoscopy  Indications:          Hematochezia, Rectal pain  Providers:            Timmy Krishna MD  Referring MD:         Leonardo Rodrigues MD  Sedation:             Monitored Anesthesia Care  Procedure:       Pre-Anesthesia Assessment:       - Prior to the procedure, a History and Physical was performed, and       patient medications and allergies were reviewed. The patient is       competent. The risks and benefits of the procedure and the sedation       options and risks were discussed with the patient. All questions were       answered and informed consent was obtained. Patient identification and       proposed procedure were verified by the physician, the nurse and the       anesthesiologist in the pre-procedure area in the procedure room. Mental       Status Examination: normal. Airway Examination: normal oropharyngeal       airway and neck mobility. Respiratory Examination: clear to       auscultation. CV Examination: normal. Prophylactic Antibiotics: The       patient does not require prophylactic antibiotics. Prior Anticoagulants:       The patient has taken no previous anticoagulant or antiplatelet agents.       ASA Grade Assessment: II - A patient with mild systemic disease. After       reviewing the risks and benefits, the patient was deemed in satisfactory       condition to undergo the procedure. The anesthesia plan was to use       monitored anesthesia care (MAC). Immediately prior to administration of       medications, the patient was re-assessed for adequacy to receive       sedatives. The heart rate, respiratory rate, oxygen saturations, blood       pressure, adequacy of  pulmonary ventilation, and response to care were       monitored throughout the procedure. The physical status of the patient       was re-assessed after the procedure.       A History and Physical was performed prior to the procedure. Patient       medications and allergies were reviewed. The risks and benefits of the       procedure and sedation options were discussed. Questions were answered       and informed consent was obtained. Patient identification and proposed       procedure were verified. The patient was deemed in satisfactory       condition to undergo the procedure. The heart rate, respiratory rate,       oxygen saturations, blood pressure and response to sedation were       monitored throughout the procedure.       The endoscope was passed under direct vision. The Colonoscope was       introduced through the anus and advanced to the terminal ileum, with       identification of the appendiceal orifice and IC valve. The physical       status of the patient was re-assessed after the procedure. The       colonoscopy was performed without difficulty. The patient tolerated the       procedure well. The quality of the bowel preparation was excellent.  Scope Withdrawal Time 0 hours 6 minutes 0 seconds  Findings:       The perianal exam findings include anal fissure.       The sigmoid colon, descending colon, transverse colon, ascending colon,       cecum and ileum appeared normal.       Non-bleeding internal hemorrhoids were found during retroflexion. The       hemorrhoids were small and Grade I (internal hemorrhoids that do not       prolapse).       A anal fissure was found in the anal canal. Manual dilation was       successfully performed.  Impression:       - Anal fissure found on perianal exam.       - The sigmoid colon, descending colon, transverse colon, ascending       colon, cecum and terminal ileum are normal.       - Non-bleeding internal hemorrhoids.       - Anal fissure.       - No specimens  collected.  Recommendation:       - Discharge patient to home (ambulatory).       - High fiber diet.       - Colace capsule(s) orally 50 mg daily.       - Repeat colonoscopy at age 52 for screening purposes.  Complications:        No immediate complications.  Estimated Blood Loss: Estimated blood loss: none.  Timmy Krishna M.D.  Timmy Krishna MD  10/31/2017 8:11:12 AM  This report has been signed electronically by the above.  Number of Addenda: 0       No Specimens removed during this procedure unless otherwise noted.     No

## 2023-12-02 NOTE — DISCHARGE NOTE PROVIDER - PROVIDER TOKENS
PROVIDER:[TOKEN:[[id number]]] PROVIDER:[TOKEN:[[id number]]] PROVIDER:[TOKEN:[[id number]]]

## 2023-12-02 NOTE — DISCHARGE NOTE NURSING/CASE MANAGEMENT/SOCIAL WORK - PATIENT PORTAL LINK FT
You can access the FollowMyHealth Patient Portal offered by [facility] by registering at the following website: [email or web address]. By joining [facility]’s FollowMyHealth portal, you will also be able to view your health information using other applications (apps) compatible with our system.

## 2023-12-02 NOTE — DISCHARGE NOTE PROVIDER - NSDCFUSCHEDAPPT_GEN_ALL_CORE_FT
Nati Mancia  [facility]  [address] [facility]  Scheduled Appointment: 12/11/2023    Maryjane Martinez  [facility] Physician [facility]  UROLOGY [address] [facility]  Scheduled Appointment: 12/28/2023     Nati Mancia  [facility] Physician [facility]  [address] [facility]  Scheduled Appointment: 12/11/2023    Maryjane Martinez  [facility] Physician [facility]  UROLOGY [address] [facility]  Scheduled Appointment: 12/28/2023     Nati Mancia  [facility] Physician [facility]  [address] [facility]  Scheduled Appointment: 12/11/2023    Maryjane Martinez  [facility] Physician [facility]  UROLOGY [address] [facility]  Scheduled Appointment: 12/28/2023

## 2023-12-02 NOTE — DISCHARGE NOTE PROVIDER - NSDCCPCAREPLAN_GEN_ALL_CORE_FT
PRINCIPAL DISCHARGE DIAGNOSIS  Diagnosis: Kidney stone  Assessment and Plan of Treatment:       SECONDARY DISCHARGE DIAGNOSES  Diagnosis: KRISTEN (acute kidney injury)  Assessment and Plan of Treatment:

## 2023-12-02 NOTE — PROGRESS NOTE ADULT - SUBJECTIVE AND OBJECTIVE BOX
UROLOGY: Pt is a 57 y/o M s/p cystoscopy, placement of RIGHT JJ stent POD # 1. Pt seen and examined at bedside. Reports feeling much better today, less fatigued, more appetite. Admits to slight pressure in right flank when voiding, otherwise denies any issues voiding, dysuria, hematuria, fevers, chills at this time.     REVIEW OF SYSTEMS   [x] A ten-point review of systems was otherwise negative except as noted.    Vital Signs Last 24 Hrs  T(C): 36.7 (02 Dec 2023 08:16), Max: 38.7 (01 Dec 2023 17:36)  T(F): 98.1 (02 Dec 2023 08:16), Max: 101.7 (01 Dec 2023 17:36)  HR: 67 (02 Dec 2023 08:16) (66 - 92)  BP: 105/60 (02 Dec 2023 08:16) (105/60 - 141/77)  BP(mean): 90 (02 Dec 2023 00:00) (83 - 90)  RR: 18 (02 Dec 2023 08:16) (14 - 20)  SpO2: 99% (02 Dec 2023 08:16) (96% - 99%)    PHYSICAL EXAM:  GEN: NAD, awake and alert.  SKIN: Good color, non diaphoretic.  RESP: Non-labored breathing. No use of accessory muscles.  CARDIO: +S1/S2  ABDO: Soft, NT/ND  BACK: No CVAT B/L  : voiding freely. no sp tenderness. bladder nonpalpable.  EXT: ALLEN x 4    LABS:             15.1   10.59 )-----------( 175      ( 01 Dec 2023 04:55 )             46.5     137  |  103  |  16  ----------------------------<  110<H>  5.3<H>   |  23  |  2.2<H> UROLOGY: Pt is a 59 y/o M s/p cystoscopy, placement of RIGHT JJ stent POD # 1. Pt seen and examined at bedside. Reports feeling much better today, less fatigued, more appetite. Admits to slight pressure in right flank when voiding, otherwise denies any issues voiding, dysuria, hematuria, fevers, chills at this time.     REVIEW OF SYSTEMS   [x] A ten-point review of systems was otherwise negative except as noted.    Vital Signs Last 24 Hrs  T(C): 36.7 (02 Dec 2023 08:16), Max: 38.7 (01 Dec 2023 17:36)  T(F): 98.1 (02 Dec 2023 08:16), Max: 101.7 (01 Dec 2023 17:36)  HR: 67 (02 Dec 2023 08:16) (66 - 92)  BP: 105/60 (02 Dec 2023 08:16) (105/60 - 141/77)  BP(mean): 90 (02 Dec 2023 00:00) (83 - 90)  RR: 18 (02 Dec 2023 08:16) (14 - 20)  SpO2: 99% (02 Dec 2023 08:16) (96% - 99%)    PHYSICAL EXAM:  GEN: NAD, awake and alert.  SKIN: Good color, non diaphoretic.  RESP: Non-labored breathing. No use of accessory muscles.  CARDIO: +S1/S2  ABDO: Soft, NT/ND  BACK: No CVAT B/L  : voiding freely. no sp tenderness. bladder nonpalpable.  EXT: ALLEN x 4    LABS:             15.1   10.59 )-----------( 175      ( 01 Dec 2023 04:55 )             46.5     137  |  103  |  16  ----------------------------<  110<H>  5.3<H>   |  23  |  2.2<H>

## 2023-12-02 NOTE — PATIENT PROFILE ADULT - FALL HARM RISK - HARM RISK INTERVENTIONS

## 2023-12-02 NOTE — DISCHARGE NOTE PROVIDER - NSDCMRMEDTOKEN_GEN_ALL_CORE_FT
lisinopril 10 mg oral tablet: 1 tab(s) orally once a day   cefpodoxime 200 mg oral tablet: 1 tab(s) orally every 12 hours MDD: 2 TABS  lisinopril 10 mg oral tablet: 1 tab(s) orally once a day

## 2023-12-03 LAB
CULTURE RESULTS: NO GROWTH — SIGNIFICANT CHANGE UP
CULTURE RESULTS: NO GROWTH — SIGNIFICANT CHANGE UP
SPECIMEN SOURCE: SIGNIFICANT CHANGE UP
SPECIMEN SOURCE: SIGNIFICANT CHANGE UP

## 2023-12-06 NOTE — CHART NOTE - NSCHARTNOTEFT_GEN_A_CORE
on 11/30/2023 patient presented to hospital.  CT found obstructing right ureteral stone.  He had fever up to 101F and chills. on 11/30/2023 patient presented to hospital.  CT found obstructing right ureteral stone.  He had fever up to 101F and chills.  Pt also sound to have acute kidney injury with creatinine of 2.2      however cutlure that grew out after patient was discharged showed no growth in the urine.  Sepsis was ruled out.  Unclear was the origin of the fever was.      The patients acute kidney injury resolved after the placement of the ureteral stent.

## 2023-12-07 DIAGNOSIS — N13.2 HYDRONEPHROSIS WITH RENAL AND URETERAL CALCULOUS OBSTRUCTION: ICD-10-CM

## 2023-12-07 DIAGNOSIS — N17.9 ACUTE KIDNEY FAILURE, UNSPECIFIED: ICD-10-CM

## 2023-12-07 DIAGNOSIS — R50.9 FEVER, UNSPECIFIED: ICD-10-CM

## 2023-12-07 DIAGNOSIS — I10 ESSENTIAL (PRIMARY) HYPERTENSION: ICD-10-CM

## 2023-12-08 ENCOUNTER — APPOINTMENT (OUTPATIENT)
Dept: UROLOGY | Facility: CLINIC | Age: 58
End: 2023-12-08
Payer: COMMERCIAL

## 2023-12-08 VITALS
HEIGHT: 67 IN | HEART RATE: 62 BPM | TEMPERATURE: 98.3 F | WEIGHT: 186 LBS | BODY MASS INDEX: 29.19 KG/M2 | SYSTOLIC BLOOD PRESSURE: 159 MMHG | DIASTOLIC BLOOD PRESSURE: 85 MMHG

## 2023-12-08 PROCEDURE — 99213 OFFICE O/P EST LOW 20 MIN: CPT

## 2023-12-11 ENCOUNTER — APPOINTMENT (OUTPATIENT)
Dept: OTOLARYNGOLOGY | Facility: CLINIC | Age: 58
End: 2023-12-11
Payer: COMMERCIAL

## 2023-12-11 PROCEDURE — 31575 DIAGNOSTIC LARYNGOSCOPY: CPT

## 2023-12-11 PROCEDURE — 99204 OFFICE O/P NEW MOD 45 MIN: CPT | Mod: 25

## 2023-12-11 PROCEDURE — 99214 OFFICE O/P EST MOD 30 MIN: CPT | Mod: 25

## 2023-12-19 ENCOUNTER — OUTPATIENT (OUTPATIENT)
Dept: OUTPATIENT SERVICES | Facility: HOSPITAL | Age: 58
LOS: 1 days | End: 2023-12-19
Payer: COMMERCIAL

## 2023-12-19 VITALS
DIASTOLIC BLOOD PRESSURE: 82 MMHG | HEIGHT: 67 IN | HEART RATE: 68 BPM | OXYGEN SATURATION: 100 % | SYSTOLIC BLOOD PRESSURE: 142 MMHG | TEMPERATURE: 98 F | WEIGHT: 175.05 LBS | RESPIRATION RATE: 17 BRPM

## 2023-12-19 DIAGNOSIS — N20.0 CALCULUS OF KIDNEY: Chronic | ICD-10-CM

## 2023-12-19 DIAGNOSIS — N20.1 CALCULUS OF URETER: ICD-10-CM

## 2023-12-19 DIAGNOSIS — Z01.818 ENCOUNTER FOR OTHER PREPROCEDURAL EXAMINATION: ICD-10-CM

## 2023-12-19 DIAGNOSIS — Z98.890 OTHER SPECIFIED POSTPROCEDURAL STATES: Chronic | ICD-10-CM

## 2023-12-19 DIAGNOSIS — Z96.0 PRESENCE OF UROGENITAL IMPLANTS: Chronic | ICD-10-CM

## 2023-12-19 LAB
ALBUMIN SERPL ELPH-MCNC: 4.4 G/DL — SIGNIFICANT CHANGE UP (ref 3.5–5.2)
ALBUMIN SERPL ELPH-MCNC: 4.4 G/DL — SIGNIFICANT CHANGE UP (ref 3.5–5.2)
ALP SERPL-CCNC: 51 U/L — SIGNIFICANT CHANGE UP (ref 30–115)
ALP SERPL-CCNC: 51 U/L — SIGNIFICANT CHANGE UP (ref 30–115)
ALT FLD-CCNC: 6 U/L — SIGNIFICANT CHANGE UP (ref 0–41)
ALT FLD-CCNC: 6 U/L — SIGNIFICANT CHANGE UP (ref 0–41)
ANION GAP SERPL CALC-SCNC: 13 MMOL/L — SIGNIFICANT CHANGE UP (ref 7–14)
ANION GAP SERPL CALC-SCNC: 13 MMOL/L — SIGNIFICANT CHANGE UP (ref 7–14)
AST SERPL-CCNC: 16 U/L — SIGNIFICANT CHANGE UP (ref 0–41)
AST SERPL-CCNC: 16 U/L — SIGNIFICANT CHANGE UP (ref 0–41)
BILIRUB SERPL-MCNC: 0.6 MG/DL — SIGNIFICANT CHANGE UP (ref 0.2–1.2)
BILIRUB SERPL-MCNC: 0.6 MG/DL — SIGNIFICANT CHANGE UP (ref 0.2–1.2)
BUN SERPL-MCNC: 14 MG/DL — SIGNIFICANT CHANGE UP (ref 10–20)
BUN SERPL-MCNC: 14 MG/DL — SIGNIFICANT CHANGE UP (ref 10–20)
CALCIUM SERPL-MCNC: 9.7 MG/DL — SIGNIFICANT CHANGE UP (ref 8.4–10.5)
CALCIUM SERPL-MCNC: 9.7 MG/DL — SIGNIFICANT CHANGE UP (ref 8.4–10.5)
CHLORIDE SERPL-SCNC: 102 MMOL/L — SIGNIFICANT CHANGE UP (ref 98–110)
CHLORIDE SERPL-SCNC: 102 MMOL/L — SIGNIFICANT CHANGE UP (ref 98–110)
CO2 SERPL-SCNC: 27 MMOL/L — SIGNIFICANT CHANGE UP (ref 17–32)
CO2 SERPL-SCNC: 27 MMOL/L — SIGNIFICANT CHANGE UP (ref 17–32)
CREAT SERPL-MCNC: 1.2 MG/DL — SIGNIFICANT CHANGE UP (ref 0.7–1.5)
CREAT SERPL-MCNC: 1.2 MG/DL — SIGNIFICANT CHANGE UP (ref 0.7–1.5)
EGFR: 71 ML/MIN/1.73M2 — SIGNIFICANT CHANGE UP
EGFR: 71 ML/MIN/1.73M2 — SIGNIFICANT CHANGE UP
GLUCOSE SERPL-MCNC: 114 MG/DL — HIGH (ref 70–99)
GLUCOSE SERPL-MCNC: 114 MG/DL — HIGH (ref 70–99)
POTASSIUM SERPL-MCNC: 4.1 MMOL/L — SIGNIFICANT CHANGE UP (ref 3.5–5)
POTASSIUM SERPL-MCNC: 4.1 MMOL/L — SIGNIFICANT CHANGE UP (ref 3.5–5)
POTASSIUM SERPL-SCNC: 4.1 MMOL/L — SIGNIFICANT CHANGE UP (ref 3.5–5)
POTASSIUM SERPL-SCNC: 4.1 MMOL/L — SIGNIFICANT CHANGE UP (ref 3.5–5)
PROT SERPL-MCNC: 7.3 G/DL — SIGNIFICANT CHANGE UP (ref 6–8)
PROT SERPL-MCNC: 7.3 G/DL — SIGNIFICANT CHANGE UP (ref 6–8)
SODIUM SERPL-SCNC: 142 MMOL/L — SIGNIFICANT CHANGE UP (ref 135–146)
SODIUM SERPL-SCNC: 142 MMOL/L — SIGNIFICANT CHANGE UP (ref 135–146)

## 2023-12-19 PROCEDURE — 80053 COMPREHEN METABOLIC PANEL: CPT

## 2023-12-19 PROCEDURE — 99214 OFFICE O/P EST MOD 30 MIN: CPT | Mod: 25

## 2023-12-19 PROCEDURE — 36415 COLL VENOUS BLD VENIPUNCTURE: CPT

## 2023-12-19 RX ORDER — LISINOPRIL 2.5 MG/1
1 TABLET ORAL
Qty: 0 | Refills: 0 | DISCHARGE

## 2023-12-19 NOTE — H&P PST ADULT - NSICDXFAMILYHX_GEN_ALL_CORE_FT
FAMILY HISTORY:  Father  Still living? Yes, Estimated age: Age Unknown  Family history of diabetes mellitus (DM), Age at diagnosis: Age Unknown  FH: glaucoma, Age at diagnosis: Age Unknown  FH: HTN (hypertension), Age at diagnosis: Age Unknown    Mother  Still living? Yes, Estimated age: Age Unknown  FH: hypercholesterolemia, Age at diagnosis: Age Unknown

## 2023-12-19 NOTE — H&P PST ADULT - NSICDXPASTMEDICALHX_GEN_ALL_CORE_FT
PAST MEDICAL HISTORY:  KRISTEN (acute kidney injury)     HTN (hypertension) 2 yr    Kidney stones

## 2023-12-19 NOTE — H&P PST ADULT - HISTORY OF PRESENT ILLNESS
PATIENT DENIES CHEST PAIN, SHORTNESS OF BREATH, PALPITATIONS, COUGHING, FEVER, DYSURIA.    NO COUGH, FEVER, SORE THROAT, HEADACHE, LOSS OF TASTE OR SMELL. NO KNOWN EXPOSURE TO ANYONE WITH COVID. PATIENT WAS INSTRUCTED TO ISOLATE FROM NOW UNTIL THE SURGERY.    Anesthesia Alert  NO--Difficult Airway  NO--History of neck surgery or radiation  NO--Limited ROM of neck  NO--History of Malignant hyperthermia  NO--Personal or family history of Pseudocholinesterase deficiency  NO--Prior Anesthesia Complication  NO--Latex Allergy  NO--Loose teeth  NO--History of Rheumatoid Arthritis  NO--HOSSEIN  NO--bleeding risk    D[facility] Activity Status Index (DASI)   RESULT SUMMARY:  58.2 points  The higher the score (maximum 58.2), the higher the functional status.  9.89 METs  INPUTS:  Take care of self —> 2.75 = Yes  Walk indoors —> 1.75 = Yes  Walk 1&ndash;2 blocks on level ground —> 2.75 = Yes  Climb a flight of stairs or walk up a hill —> 5.5 = Yes  Run a short distance —> 8 = Yes  Do light work around the house —> 2.7 = Yes  Do moderate work around the house —> 3.5 = Yes  Do heavy work around the house —> 8 = Yes  Do yardwork —> 4.5 = Yes  Have sexual relations —> 5.25 = Yes  Participate in moderate recreational activities —> 6 = Yes  Participate in strenuous sports —> 7.5 = Yes      Revised Cardiac Risk Index for Pre-Operative Risk   RESULT SUMMARY:  0 points  Class I Risk  3.9 %  30-day risk of death, MI, or cardiac arrest  INPUTS:  Elevated-risk surgery —> 0 = No  History of ischemic heart disease —> 0 = No  History of congestive heart failure —> 0 = No  History of cerebrovascular disease —> 0 = No  Pre-operative treatment with insulin —> 0 = No  Pre-operative creatinine >2 mg/dL / 176.8 µmol/L —> 0 = No

## 2023-12-19 NOTE — H&P PST ADULT - BP NONINVASIVE MEAN (MM HG)
Hx of 6 months of weight loss, FTT 2/2 neuromuscular disorder (ALS) vs paraneoplastic (less likely due to negative CT findings vs infection (Endocarditis? - less likely due to TTE results, no blood cx, no murmur) vs psychiatric illness  - Physical therapy evaluation  - UTI treatment as above  - continue with wellbutrin, sertraline   - TTE without new findings  - discussed with Neurologist outpatient Dr. Winters; will need EMG as outpatient , no further workup for now. 102

## 2023-12-19 NOTE — H&P PST ADULT - REASON FOR ADMISSION
59 Y/O MALE HERE FOR PRE-ADMISSION SURGICAL TESTING. PATIENT REPORTS HE DEVELOPED MID ABDOMINAL PAIN 11/21/23. HE SAID IT LASTED FOR A FEW DAYS. IT STARTED TO GET WORSE AND IT WAS ASSOCIATED WITH NAUSEA. HE WENT TO THE ER ON 12/1/23. WORKUP, WHICH INCLUDED A CT SCAN REVEALED, + OBSTRUCTING. RIGHT URETERAL STONE. S/P CYSTOSCOPY WITH JJ STENT. HE HAD FEVER  WITH CHILLS. HE WAS HAD AN KRISTEN WITH CREAT OF 2.2.  NOW FOR SCHEDULED CYSTOSCOPY, RIGHT URETEROSCOPY, LASER LITHOTRIPSY, RIGHT URETERAL STENT EXCHANGE. 57 Y/O MALE HERE FOR PRE-ADMISSION SURGICAL TESTING. PATIENT REPORTS HE DEVELOPED MID ABDOMINAL PAIN 11/21/23. HE SAID IT LASTED FOR A FEW DAYS. IT STARTED TO GET WORSE AND IT WAS ASSOCIATED WITH NAUSEA. HE WENT TO THE ER ON 12/1/23. WORKUP, WHICH INCLUDED A CT SCAN REVEALED, + OBSTRUCTING. RIGHT URETERAL STONE. S/P CYSTOSCOPY WITH JJ STENT. HE HAD FEVER  WITH CHILLS. HE WAS HAD AN KRISTEN WITH CREAT OF 2.2.  NOW FOR SCHEDULED CYSTOSCOPY, RIGHT URETEROSCOPY, LASER LITHOTRIPSY, RIGHT URETERAL STENT EXCHANGE.

## 2023-12-20 DIAGNOSIS — N20.1 CALCULUS OF URETER: ICD-10-CM

## 2023-12-20 DIAGNOSIS — Z01.818 ENCOUNTER FOR OTHER PREPROCEDURAL EXAMINATION: ICD-10-CM

## 2023-12-28 ENCOUNTER — APPOINTMENT (OUTPATIENT)
Dept: UROLOGY | Facility: CLINIC | Age: 58
End: 2023-12-28

## 2023-12-28 PROBLEM — N17.9 ACUTE KIDNEY FAILURE, UNSPECIFIED: Chronic | Status: ACTIVE | Noted: 2023-12-19

## 2023-12-28 PROBLEM — N20.0 CALCULUS OF KIDNEY: Chronic | Status: ACTIVE | Noted: 2023-12-19

## 2024-01-02 ENCOUNTER — TRANSCRIPTION ENCOUNTER (OUTPATIENT)
Age: 59
End: 2024-01-02

## 2024-01-02 ENCOUNTER — APPOINTMENT (OUTPATIENT)
Dept: UROLOGY | Facility: HOSPITAL | Age: 59
End: 2024-01-02

## 2024-01-02 ENCOUNTER — OUTPATIENT (OUTPATIENT)
Dept: OUTPATIENT SERVICES | Facility: HOSPITAL | Age: 59
LOS: 1 days | Discharge: HOME | End: 2024-01-02
Payer: COMMERCIAL

## 2024-01-02 VITALS
TEMPERATURE: 98 F | SYSTOLIC BLOOD PRESSURE: 174 MMHG | HEART RATE: 56 BPM | WEIGHT: 182.1 LBS | DIASTOLIC BLOOD PRESSURE: 88 MMHG | HEIGHT: 67 IN | OXYGEN SATURATION: 99 % | RESPIRATION RATE: 17 BRPM

## 2024-01-02 VITALS — HEART RATE: 70 BPM | RESPIRATION RATE: 18 BRPM | SYSTOLIC BLOOD PRESSURE: 190 MMHG | DIASTOLIC BLOOD PRESSURE: 89 MMHG

## 2024-01-02 DIAGNOSIS — Z96.0 PRESENCE OF UROGENITAL IMPLANTS: Chronic | ICD-10-CM

## 2024-01-02 DIAGNOSIS — N20.1 CALCULUS OF URETER: ICD-10-CM

## 2024-01-02 DIAGNOSIS — Z98.890 OTHER SPECIFIED POSTPROCEDURAL STATES: Chronic | ICD-10-CM

## 2024-01-02 PROCEDURE — C1766: CPT

## 2024-01-02 PROCEDURE — 82365 CALCULUS SPECTROSCOPY: CPT

## 2024-01-02 PROCEDURE — 52356 CYSTO/URETERO W/LITHOTRIPSY: CPT | Mod: RT

## 2024-01-02 PROCEDURE — C1769: CPT

## 2024-01-02 PROCEDURE — C2617: CPT

## 2024-01-02 PROCEDURE — C1889: CPT

## 2024-01-02 PROCEDURE — 52352 CYSTOURETERO W/STONE REMOVE: CPT | Mod: RT,XS

## 2024-01-02 RX ORDER — HYDRALAZINE HCL 50 MG
5 TABLET ORAL ONCE
Refills: 0 | Status: COMPLETED | OUTPATIENT
Start: 2024-01-02 | End: 2024-01-02

## 2024-01-02 RX ORDER — PHENAZOPYRIDINE HCL 100 MG
1 TABLET ORAL
Qty: 4 | Refills: 0
Start: 2024-01-02 | End: 2024-01-03

## 2024-01-02 RX ORDER — TAMSULOSIN HYDROCHLORIDE 0.4 MG/1
1 CAPSULE ORAL
Qty: 14 | Refills: 0
Start: 2024-01-02 | End: 2024-01-15

## 2024-01-02 RX ORDER — SODIUM CHLORIDE 9 MG/ML
1000 INJECTION, SOLUTION INTRAVENOUS
Refills: 0 | Status: DISCONTINUED | OUTPATIENT
Start: 2024-01-02 | End: 2024-01-02

## 2024-01-02 RX ORDER — HYDROMORPHONE HYDROCHLORIDE 2 MG/ML
0.5 INJECTION INTRAMUSCULAR; INTRAVENOUS; SUBCUTANEOUS
Refills: 0 | Status: DISCONTINUED | OUTPATIENT
Start: 2024-01-02 | End: 2024-01-02

## 2024-01-02 RX ORDER — ACETAMINOPHEN 500 MG
2 TABLET ORAL
Qty: 40 | Refills: 0
Start: 2024-01-02 | End: 2024-01-06

## 2024-01-02 RX ORDER — DOCUSATE SODIUM 100 MG
1 CAPSULE ORAL
Qty: 42 | Refills: 0
Start: 2024-01-02 | End: 2024-01-15

## 2024-01-02 RX ADMIN — Medication 5 MILLIGRAM(S): at 12:25

## 2024-01-02 RX ADMIN — SODIUM CHLORIDE 75 MILLILITER(S): 9 INJECTION, SOLUTION INTRAVENOUS at 12:18

## 2024-01-02 NOTE — CHART NOTE - NSCHARTNOTEFT_GEN_A_CORE
PACU ANESTHESIA ADMISSION NOTE      Procedure: Ureteroscopy with laser lithotripsy and stent placement    Manipulation, calculus, kidney, right      Post op diagnosis:  Right ureteral stone    Right renal stone        ____  Intubated  TV:______       Rate: ______      FiO2: ______    __x__  Patent Airway    __x_  Full return of protective reflexes    _x___  Full recovery from anesthesia / back to baseline     Vitals:   T:   36        R:   11               BP:  125/74                Sat: 99                  P: 74      Mental Status:  _x___ Awake   _x____ Alert   _____ Drowsy   _____ Sedated    Nausea/Vomiting:  x____ NO  ______Yes,   See Post - Op Orders          Pain Scale (0-10):  __x___    Treatment: ____ None    ____ See Post - Op/PCA Orders    Post - Operative Fluids:   ____ Oral   _x___ See Post - Op Orders    Plan: Discharge:   x____Home       _____Floor     _____Critical Care    _____  Other:_________________    Comments:

## 2024-01-02 NOTE — ASU DISCHARGE PLAN (ADULT/PEDIATRIC) - NS MD DC FALL RISK RISK
For information on Fall & Injury Prevention, visit: [email or web address] OR  [email or web address] OR  https://www.cdc.gov/steadi/patient.html For information on Fall & Injury Prevention, visit: [email or web address] OR  [email or web address] OR  https://www.cdc.gov/steadi/patient.html

## 2024-01-02 NOTE — ASU PATIENT PROFILE, ADULT - FALL HARM RISK - UNIVERSAL INTERVENTIONS
Bed in lowest position, wheels locked, appropriate side rails in place/Call bell, personal items and telephone in reach/Instruct patient to call for assistance before getting out of bed or chair/Non-slip footwear when patient is out of bed/[place] to call system/Physically safe environment - no spills, clutter or unnecessary equipment/Purposeful Proactive Rounding/Room/bathroom lighting operational, light cord in reach Bed in lowest position, wheels locked, appropriate side rails in place/Call bell, personal items and telephone in reach/Instruct patient to call for assistance before getting out of bed or chair/Non-slip footwear when patient is out of bed/[place] to call system/Physically safe environment - no spills, clutter or unnecessary equipment/Purposeful Proactive Rounding/Room/bathroom lighting operational, light cord in reach

## 2024-01-02 NOTE — ASU PREOP CHECKLIST - BP NONINVASIVE DIASTOLIC (MM HG)
NNP Note:    Infant noted to have one A/B event witnessed by RN who notified this NNP. Sats to 50s, no HR change, + color change; no intervention needed. At 1451, this NNP witnessed A/B/D spell with HR to 50s and HR to 50s, + color change, apnea ~ 45 sec. Required tactile stim and then took several minutes to fully recover to baseline. Discussed with Dr. Daniel Evans who agreed with plan to cancel circumcision and discharge. Will move infant out of private room back into main nursery area. Will initiate sepsis w/u and start antibiotics. MOB notified, updated with plan of care and questions answered.
88

## 2024-01-02 NOTE — ASU DISCHARGE PLAN (ADULT/PEDIATRIC) - MODE OF TRANSPORTATION
PT RESTING QUIETLY AT THIS TIME. NO NOTED BLEEDING OF ORAL OR RECTAL THIS
SHIFT SINCE PT ARRIVED ON FLOOR LAST EVENING. WILL CONTINUE TO MONITOR Wheelchair/Stroller

## 2024-01-02 NOTE — BRIEF OPERATIVE NOTE - NSICDXBRIEFPOSTOP_GEN_ALL_CORE_FT
POST-OP DIAGNOSIS:  Right ureteral stone 02-Jan-2024 11:42:04  Doni Clayton  Right renal stone 02-Jan-2024 11:42:10  Doni Clayton

## 2024-01-02 NOTE — ASU DISCHARGE PLAN (ADULT/PEDIATRIC) - PROCEDURE
cystoscopy, right ureteroscopy, laser lithotripsy of right renal stone and right ureteral stone, right ureteral stent exchange

## 2024-01-02 NOTE — BRIEF OPERATIVE NOTE - OPERATION/FINDINGS
right ureteral stone fragmented, flexible ureteroscopy in right kidney found right kidney stone which was also fragmented -- fragments were removed/manipulated with basket, right ureteral stent was exchanged == 6 x 24cm

## 2024-01-02 NOTE — BRIEF OPERATIVE NOTE - NSICDXBRIEFPROCEDURE_GEN_ALL_CORE_FT
PROCEDURES:  Ureteroscopy with laser lithotripsy and stent placement 02-Jan-2024 11:41:35  Doni Clayton  Manipulation, calculus, kidney, right 02-Jan-2024 11:41:45  Doni Clayton

## 2024-01-02 NOTE — ASU DISCHARGE PLAN (ADULT/PEDIATRIC) - COMMENTS
office will call to give appt next week to remove the stent in [facility] [place] office office will call to give appt next week to remove the stent in [facility] [place] office

## 2024-01-04 LAB
BILIRUB UR QL STRIP: NORMAL
COLLECTION METHOD: NORMAL
GLUCOSE UR-MCNC: NORMAL
HCG UR QL: 0.2 EU/DL
HGB UR QL STRIP.AUTO: NORMAL
KETONES UR-MCNC: NORMAL
LEUKOCYTE ESTERASE UR QL STRIP: NORMAL
NITRITE UR QL STRIP: NORMAL
PH UR STRIP: 5.5
PROT UR STRIP-MCNC: NORMAL
SP GR UR STRIP: >=1.03

## 2024-01-05 DIAGNOSIS — N20.0 CALCULUS OF KIDNEY: ICD-10-CM

## 2024-01-05 DIAGNOSIS — I10 ESSENTIAL (PRIMARY) HYPERTENSION: ICD-10-CM

## 2024-01-05 DIAGNOSIS — N20.2 CALCULUS OF KIDNEY WITH CALCULUS OF URETER: ICD-10-CM

## 2024-01-08 ENCOUNTER — APPOINTMENT (OUTPATIENT)
Dept: UROLOGY | Facility: CLINIC | Age: 59
End: 2024-01-08
Payer: COMMERCIAL

## 2024-01-08 VITALS
SYSTOLIC BLOOD PRESSURE: 140 MMHG | BODY MASS INDEX: 28.41 KG/M2 | HEART RATE: 70 BPM | WEIGHT: 181 LBS | DIASTOLIC BLOOD PRESSURE: 72 MMHG | HEIGHT: 67 IN

## 2024-01-08 LAB
CELL MATERIAL STONE EST-MCNT: SIGNIFICANT CHANGE UP
CELL MATERIAL STONE EST-MCNT: SIGNIFICANT CHANGE UP
LABORATORY COMMENT REPORT: SIGNIFICANT CHANGE UP
LABORATORY COMMENT REPORT: SIGNIFICANT CHANGE UP
NIDUS STONE QN: SIGNIFICANT CHANGE UP
NIDUS STONE QN: SIGNIFICANT CHANGE UP

## 2024-01-08 PROCEDURE — 99213 OFFICE O/P EST LOW 20 MIN: CPT

## 2024-01-08 NOTE — HISTORY OF PRESENT ILLNESS
[FreeTextEntry1] : Romero is a 58-year-old male born August 5, 1965 who I last saw in May 2023 and he had bloods in September 2023.  He has been on Testopel and comes in today theoretically for his next dose which he was due for over 4 months ago telling me he is decided to stop the Testopel.  He wants to let the drug get out of his system, and then see how he is doing.  In the interim he ended up with ureteroscopy as when Dr. Clayton was on call Romero had another stone he passed the stent then went back and did ureteroscopy took out the stone left a stent which he is taking out this Friday.  Romero is going to continue with Dr. Clayton for the stone disease and if when he decides to go further with the testosterone he will contact me.  Please note he tells me he was able to have satisfactory sex last week though his levels, see below what I very low levels 4 months ago.  Blood test on September 5, 2023 with him having gotten 6 pellets in May 2023 showed Total testosterone was 255 Free testosterone was 30.9 Bioavailable testosterone 59.5 Sex hormone binding globulin was 35 with an albumin of 4.2 Estradiol was 32

## 2024-01-08 NOTE — ASSESSMENT
[FreeTextEntry1] : I reviewed the side effects of hypogonadism with Romero including decreased muscle mass, bone density, insomnia, mental confusion, lack of libido, decreased sexual performance he understands and says that for now he is feeling okay and if he decides he wants to start treatment again he will contact me.  Otherwise he will stay with Dr. Clayton for his stone disease.

## 2024-01-08 NOTE — LETTER HEADER
[FreeTextEntry3] : Maryjane Martinez MD  [address] [place] [address], [address] [place] [id number]

## 2024-01-08 NOTE — LETTER BODY
[Dear  ___] : Dear  [unfilled], [Courtesy Letter:] : I had the pleasure of seeing your patient, [unfilled], in my office today. [Please see my note below.] : Please see my note below. [Sincerely,] : Sincerely, [FreeTextEntry2] : Norman Akhtar MD\par  [address] [address]\par

## 2024-01-10 ENCOUNTER — RESULT REVIEW (OUTPATIENT)
Age: 59
End: 2024-01-10

## 2024-01-10 ENCOUNTER — OUTPATIENT (OUTPATIENT)
Dept: OUTPATIENT SERVICES | Facility: HOSPITAL | Age: 59
LOS: 1 days | End: 2024-01-10
Payer: COMMERCIAL

## 2024-01-10 DIAGNOSIS — Z00.8 ENCOUNTER FOR OTHER GENERAL EXAMINATION: ICD-10-CM

## 2024-01-10 DIAGNOSIS — Z98.890 OTHER SPECIFIED POSTPROCEDURAL STATES: Chronic | ICD-10-CM

## 2024-01-10 DIAGNOSIS — R22.1 LOCALIZED SWELLING, MASS AND LUMP, NECK: ICD-10-CM

## 2024-01-10 DIAGNOSIS — Z96.0 PRESENCE OF UROGENITAL IMPLANTS: Chronic | ICD-10-CM

## 2024-01-10 PROCEDURE — 70491 CT SOFT TISSUE NECK W/DYE: CPT | Mod: 26

## 2024-01-10 PROCEDURE — 70491 CT SOFT TISSUE NECK W/DYE: CPT

## 2024-01-11 DIAGNOSIS — R22.1 LOCALIZED SWELLING, MASS AND LUMP, NECK: ICD-10-CM

## 2024-01-12 ENCOUNTER — APPOINTMENT (OUTPATIENT)
Dept: UROLOGY | Facility: CLINIC | Age: 59
End: 2024-01-12
Payer: COMMERCIAL

## 2024-01-12 DIAGNOSIS — N20.1 CALCULUS OF URETER: ICD-10-CM

## 2024-01-12 DIAGNOSIS — Z96.0 PRESENCE OF UROGENITAL IMPLANTS: ICD-10-CM

## 2024-01-12 PROCEDURE — 99213 OFFICE O/P EST LOW 20 MIN: CPT

## 2024-01-12 NOTE — HISTORY OF PRESENT ILLNESS
[FreeTextEntry1] : Seen during my on call for right ureteral stone seen on CT scan with hydro and pain and fevers urine cultures were negative had stent placement  on Jan 2 2024- laser lithotripsy of right ureteral and right renal stones right renal stone fragments were removed with basket and sent for chemical analysis the stent was replaced with new 6 x 24 ureteral stent  Jan 12 2024 -- stent removed in office with cysto

## 2024-01-12 NOTE — PLAN
[TextEntry] : sonogram to assess hydro in 6 weeks nephrology eval for recurrent stone formation follow up in 8 weeks

## 2024-01-22 ENCOUNTER — APPOINTMENT (OUTPATIENT)
Dept: NEPHROLOGY | Facility: CLINIC | Age: 59
End: 2024-01-22
Payer: COMMERCIAL

## 2024-01-22 VITALS
HEIGHT: 67 IN | WEIGHT: 184 LBS | HEART RATE: 85 BPM | SYSTOLIC BLOOD PRESSURE: 144 MMHG | BODY MASS INDEX: 28.88 KG/M2 | DIASTOLIC BLOOD PRESSURE: 82 MMHG | TEMPERATURE: 99 F | OXYGEN SATURATION: 95 %

## 2024-01-22 LAB
BILIRUB UR QL STRIP: NORMAL
CLARITY UR: CLEAR
COLLECTION METHOD: NORMAL
GLUCOSE UR-MCNC: NORMAL
HCG UR QL: 0.2 EU/DL
HGB UR QL STRIP.AUTO: NORMAL
KETONES UR-MCNC: NORMAL
LEUKOCYTE ESTERASE UR QL STRIP: NORMAL
NITRITE UR QL STRIP: NORMAL
PH UR STRIP: 6
PROT UR STRIP-MCNC: NORMAL
SP GR UR STRIP: 1.02

## 2024-01-22 PROCEDURE — 81003 URINALYSIS AUTO W/O SCOPE: CPT | Mod: QW

## 2024-01-22 PROCEDURE — 99204 OFFICE O/P NEW MOD 45 MIN: CPT

## 2024-01-22 RX ORDER — HYDROCHLOROTHIAZIDE 12.5 MG/1
12.5 CAPSULE ORAL
Qty: 90 | Refills: 1 | Status: ACTIVE | COMMUNITY
Start: 2024-01-22 | End: 1900-01-01

## 2024-01-22 NOTE — HISTORY OF PRESENT ILLNESS
[FreeTextEntry1] : Romero Livingston is a 59 yo M with history of right kidney stone s/p laser lithotrypsy and stent placement in Jan 2024 (removed stent 01/12/24), HTN, BPH and ED, now now referred to Nephrology for evaluation of etiology of his kidney stones.  Stone analysis noted 100% Calcium oxalate monohydrate.  He is planned for repeat imaging of his stone in 8 weeks to assess for hydro.  No symptoms today.  The patient notes no prior Renal evaluation or history of nephritis, nephrosis, brights disease, scarlet fever or rheumatic heart disease. The patient denied chronic UTI's, bladder infections, cystitis, or pyelonephritis. No known hematuria, proteinuria.   Recent Hospitalizations: none  Recent Medications changes: none  NSAIDS use: denies use  Home BP: no abnormal readings noted  Home FSBS:  no abnormal readings noted  LUTS: denies LUTS, foamy urine or hematuria  Uremic Symptoms: no pruritis, metallic taste, new onset weakness, dysphagia, poor appetite, or tremors noted  FamHx CKD/RRT:  denies  Personal History of CKD/RRT:  denies

## 2024-01-22 NOTE — ASSESSMENT
[FreeTextEntry1] : #) Nephrolithiasis:  - Advised to increase water intake to 96 oz. (2.5-3L) Daily  - Low intake of salt, sugar and animal protein advised  - Advised to increase intake of potassium, citrate and elemental calcium from dairy-based sources  - Low intake of high oxalate-containing foods, including dark green leafy vegetables, nuts, berries, black tea, and dark cocoa  - Litholink 24 hour urine testing ordered and to be performed  - Labwork ordered for PTH, PTHrp, Vit D 25-OH, Vit D 1,25 OH, Uric Acid, CMP, HgbA1c  - Will begin on HCTZ 12.5mg daily  #) Essential HTN:  BP controlled in clinic today  - advised salt restrictive diet of <2.4gm daily  - continue to monitor home BP readings and report in future clinic appointments  - continue use of Lisinopril  #) BPH without LUTS - management per Urology, currently off medication therapy for treatment

## 2024-01-22 NOTE — PHYSICAL EXAM
[General Appearance - Alert] : alert [General Appearance - In No Acute Distress] : in no acute distress [PERRL With Normal Accommodation] : pupils were equal in size, round, and reactive to light [Sclera] : the sclera and conjunctiva were normal [Extraocular Movements] : extraocular movements were intact [Oropharynx] : the oropharynx was normal [Outer Ear] : the ears and nose were normal in appearance [Neck Appearance] : the appearance of the neck was normal [Jugular Venous Distention Increased] : there was no jugular-venous distention [Neck Cervical Mass (___cm)] : no neck mass was observed [Thyroid Diffuse Enlargement] : the thyroid was not enlarged [Thyroid Nodule] : there were no palpable thyroid nodules [Auscultation Breath Sounds / Voice Sounds] : lungs were clear to auscultation bilaterally [Heart Rate And Rhythm] : heart rate was normal and rhythm regular [Heart Sounds] : normal S1 and S2 [Heart Sounds Gallop] : no gallops [Murmurs] : no murmurs [Full Pulse] : the pedal pulses are present [Heart Sounds Pericardial Friction Rub] : no pericardial rub [Edema] : there was no peripheral edema [Bowel Sounds] : normal bowel sounds [Abdomen Soft] : soft [Abdomen Tenderness] : non-tender [Abdomen Mass (___ Cm)] : no abdominal mass palpated [No CVA Tenderness] : no ~M costovertebral angle tenderness [No Spinal Tenderness] : no spinal tenderness [Abnormal Walk] : normal gait [Musculoskeletal - Swelling] : no joint swelling seen [Nail Clubbing] : no clubbing  or cyanosis of the fingernails [Motor Tone] : muscle strength and tone were normal [Skin Color & Pigmentation] : normal skin color and pigmentation [Skin Turgor] : normal skin turgor [] : no rash [Motor Exam] : the motor exam was normal [No Focal Deficits] : no focal deficits [Oriented To Time, Place, And Person] : oriented to person, place, and time [Impaired Insight] : insight and judgment were intact [Affect] : the affect was normal

## 2024-01-24 ENCOUNTER — OUTPATIENT (OUTPATIENT)
Dept: OUTPATIENT SERVICES | Facility: HOSPITAL | Age: 59
LOS: 1 days | End: 2024-01-24
Payer: COMMERCIAL

## 2024-01-24 ENCOUNTER — RESULT REVIEW (OUTPATIENT)
Age: 59
End: 2024-01-24

## 2024-01-24 DIAGNOSIS — Z98.890 OTHER SPECIFIED POSTPROCEDURAL STATES: Chronic | ICD-10-CM

## 2024-01-24 DIAGNOSIS — Z96.0 PRESENCE OF UROGENITAL IMPLANTS: Chronic | ICD-10-CM

## 2024-01-24 DIAGNOSIS — N20.1 CALCULUS OF URETER: ICD-10-CM

## 2024-01-24 DIAGNOSIS — Z00.8 ENCOUNTER FOR OTHER GENERAL EXAMINATION: ICD-10-CM

## 2024-01-24 PROCEDURE — 76775 US EXAM ABDO BACK WALL LIM: CPT | Mod: 26

## 2024-01-24 PROCEDURE — 76775 US EXAM ABDO BACK WALL LIM: CPT

## 2024-01-25 DIAGNOSIS — N20.1 CALCULUS OF URETER: ICD-10-CM

## 2024-01-26 ENCOUNTER — APPOINTMENT (OUTPATIENT)
Dept: OTOLARYNGOLOGY | Facility: CLINIC | Age: 59
End: 2024-01-26
Payer: COMMERCIAL

## 2024-01-26 DIAGNOSIS — R07.0 PAIN IN THROAT: ICD-10-CM

## 2024-01-26 DIAGNOSIS — R22.1 LOCALIZED SWELLING, MASS AND LUMP, NECK: ICD-10-CM

## 2024-01-26 PROCEDURE — 99214 OFFICE O/P EST MOD 30 MIN: CPT

## 2024-01-26 NOTE — DATA REVIEWED
[de-identified] : ACC: [id number]     EXAM:  CT NECK SOFT TISSUE IC   ORDERED BY: APOORVA ROSS  PROCEDURE DATE:  01/10/2024    INTERPRETATION:  Clinical History / Reason for exam: Lump on right side of neck for 2 months.  TECHNIQUE:  CT neck with IV contrast. Multiple CT axial images of the neck obtained following administration of 90 cc Omnipaque 350 intravenous contrast (10 cc discarded) with coronal and sagittal re-formations.  COMPARISON: CT neck dated 12/26/2019.  FINDINGS:  There is a 1.1 x 1.4 x 1.6 cm (TRV, AP, CC) calculus within the proximal right submandibular duct. It appears to be the same calculus seen on the 2019 CT, and has migrated slightly into the duct (previously seen at the level of the hilum of the gland). There a rim-enhancing fluid collection seen along the anterior and lateral margins of the calculus measuring up to 0.9 x 0.8 x 1.8 cm (TRV, AP, CC). The right submandibular gland appears mildly edematous.  The visualized intracranial and intraorbital contents are unremarkable.  There is mild scattered paranasal sinus mucosal thickening. The mastoid air cells are clear.  The parotid and left submandibular glands are unremarkable.  The visualized upper aerodigestive structures are unremarkable.  There is no CT evidence of pathologic cervical lymphadenopathy.  The thyroid gland is unremarkable.  The visualized lung apices demonstrate no focal consolidation.  There are degenerative changes of the spine as well as multilevel anterior bridging osteophytes compatible with DISH.  IMPRESSION:  1.  1.6 cm calculus within the proximal right submandibular duct. It appears to be the same calculus seen on the 2019 CT, and has migrated slightly into the duct (previously seen at the level of the hilum of the gland).  2.  Fluid collection along the anterior and lateral margins of the calculus which may reflect combination of dilated duct and/or abscess formation, measuring up to 1.8 cm.  3.  The right submandibular gland is mildly edematous.  --- End of Report ---      GABRIELLE HERRERA MD; Attending Radiologist This document has been electronically signed. Jan 11 2024 10:18AM

## 2024-01-26 NOTE — REASON FOR VISIT
[Subsequent Evaluation] : a subsequent evaluation for [FreeTextEntry2] : neck mass, throat discomfort

## 2024-01-26 NOTE — PHYSICAL EXAM
[Normal] : mucosa is normal [Midline] : trachea located in midline position [de-identified] : right upper neck/parotid mass, indurated

## 2024-01-26 NOTE — HISTORY OF PRESENT ILLNESS
[FreeTextEntry1] : Patient returns today c/o neck mass, throat discomfort. Completed clindamycin. States he is no longer having pain but the lump is still there. Continued voice change. CT 1/10/2024. Here to discuss results. Recently started to get sick, having nasal congestion. Denies use of nasal spray. No further complaints. 
Warm

## 2024-01-26 NOTE — ASSESSMENT
[FreeTextEntry1] : I personally reviewed, interpreted and discussed patient's CT images. sialadenitis with sialolithiasis.  Patient feels better after ABX.  WIll repeat US in 1M and compare.

## 2024-02-06 ENCOUNTER — OUTPATIENT (OUTPATIENT)
Dept: OUTPATIENT SERVICES | Facility: HOSPITAL | Age: 59
LOS: 1 days | End: 2024-02-06
Payer: COMMERCIAL

## 2024-02-06 DIAGNOSIS — R22.1 LOCALIZED SWELLING, MASS AND LUMP, NECK: ICD-10-CM

## 2024-02-06 DIAGNOSIS — Z98.890 OTHER SPECIFIED POSTPROCEDURAL STATES: Chronic | ICD-10-CM

## 2024-02-06 DIAGNOSIS — Z96.0 PRESENCE OF UROGENITAL IMPLANTS: Chronic | ICD-10-CM

## 2024-02-06 DIAGNOSIS — Z00.8 ENCOUNTER FOR OTHER GENERAL EXAMINATION: ICD-10-CM

## 2024-02-06 PROCEDURE — 76536 US EXAM OF HEAD AND NECK: CPT | Mod: 26

## 2024-02-06 PROCEDURE — 76536 US EXAM OF HEAD AND NECK: CPT

## 2024-02-07 DIAGNOSIS — R22.1 LOCALIZED SWELLING, MASS AND LUMP, NECK: ICD-10-CM

## 2024-02-23 ENCOUNTER — APPOINTMENT (OUTPATIENT)
Dept: OTOLARYNGOLOGY | Facility: CLINIC | Age: 59
End: 2024-02-23
Payer: COMMERCIAL

## 2024-02-23 VITALS — BODY MASS INDEX: 28.25 KG/M2 | WEIGHT: 180 LBS | HEIGHT: 67 IN

## 2024-02-23 PROCEDURE — 99214 OFFICE O/P EST MOD 30 MIN: CPT

## 2024-02-23 NOTE — HISTORY OF PRESENT ILLNESS
[FreeTextEntry1] : Patient returns today c/o neck mass and throat discomfort. He was referred by Dr. Mancia. He had  Ct performed last

## 2024-02-23 NOTE — ASSESSMENT
[FreeTextEntry1] : Plan for right SMG excision  Risks of neck surgery were discussed including unsightly scarring, bleeding, neurovascular injury including CN11 and CN12,  infection, general anesthesia, and need for reoperation\

## 2024-02-23 NOTE — REASON FOR VISIT
[Subsequent Evaluation] : a subsequent evaluation for [FreeTextEntry2] : neck mass and throat discomfort

## 2024-02-23 NOTE — PHYSICAL EXAM
[de-identified] : firm right neck SMG gland, palpable stone posteriorly [Normal] : mucosa is normal [Midline] : trachea located in midline position

## 2024-03-19 ENCOUNTER — APPOINTMENT (OUTPATIENT)
Dept: OTOLARYNGOLOGY | Facility: CLINIC | Age: 59
End: 2024-03-19

## 2024-03-25 ENCOUNTER — APPOINTMENT (OUTPATIENT)
Dept: NEPHROLOGY | Facility: CLINIC | Age: 59
End: 2024-03-25
Payer: COMMERCIAL

## 2024-03-25 VITALS
SYSTOLIC BLOOD PRESSURE: 134 MMHG | TEMPERATURE: 99 F | HEART RATE: 76 BPM | WEIGHT: 194 LBS | OXYGEN SATURATION: 95 % | DIASTOLIC BLOOD PRESSURE: 80 MMHG | HEIGHT: 67 IN | BODY MASS INDEX: 30.45 KG/M2

## 2024-03-25 LAB
BILIRUB UR QL STRIP: NORMAL
CLARITY UR: CLEAR
COLLECTION METHOD: NORMAL
GLUCOSE UR-MCNC: NORMAL
HCG UR QL: 0.2 EU/DL
HGB UR QL STRIP.AUTO: NORMAL
KETONES UR-MCNC: NORMAL
LEUKOCYTE ESTERASE UR QL STRIP: NORMAL
NITRITE UR QL STRIP: NORMAL
PH UR STRIP: 5.5
PROT UR STRIP-MCNC: NORMAL
SP GR UR STRIP: 1.03

## 2024-03-25 PROCEDURE — 81003 URINALYSIS AUTO W/O SCOPE: CPT | Mod: QW

## 2024-03-25 PROCEDURE — 99214 OFFICE O/P EST MOD 30 MIN: CPT

## 2024-03-25 NOTE — HISTORY OF PRESENT ILLNESS
[FreeTextEntry1] : Romero Livingston is a 59 yo M with history of right kidney stone s/p laser lithotrypsy and stent placement in Jan 2024 (removed stent 01/12/24), HTN, BPH and ED, here for follow-up of kidney stones.  Recent Litholink noted low CaOx saturation, low citrate, low urine pH and low CaPhos saturation.  Stone analysis noted 100% Calcium oxalate monohydrate.    Recent Hospitalizations: none  Recent Medications changes: none  NSAIDS use: denies use  Home BP: no abnormal readings noted  Home FSBS:  no abnormal readings noted  LUTS: denies LUTS, foamy urine or hematuria  Uremic Symptoms: no pruritis, metallic taste, new onset weakness, dysphagia, poor appetite, or tremors noted  FamHx CKD/RRT:  denies  Personal History of CKD/RRT:  denies

## 2024-03-25 NOTE — ASSESSMENT
[FreeTextEntry1] : #) Nephrolithiasis:  Based on Litholink results, his stone production is primarily drive by low citrate production and low urine pH - Advised to increase water intake to 96 oz. (2.5-3L) Daily  - Low intake of salt, sugar and animal protein advised  - Advised to increase intake of potassium, citrate and elemental calcium from dairy-based sources  - Low intake of high oxalate-containing foods, including dark green leafy vegetables, nuts, berries, black tea, and dark cocoa  - Stopping HCTZ, starting Cytra-K 15ml daily - Litholink 24 hour urine testing ordered and to be performed in 3 months  #) Essential HTN:  BP controlled in clinic today  - advised salt restrictive diet of <2.4gm daily  - continue to monitor home BP readings and report in future clinic appointments  - continue use of Lisinopril  #) BPH without LUTS - management per Urology, currently off medication therapy for treatment

## 2024-03-25 NOTE — PHYSICAL EXAM
[General Appearance - Alert] : alert [General Appearance - In No Acute Distress] : in no acute distress [Sclera] : the sclera and conjunctiva were normal [PERRL With Normal Accommodation] : pupils were equal in size, round, and reactive to light [Extraocular Movements] : extraocular movements were intact [Outer Ear] : the ears and nose were normal in appearance [Oropharynx] : the oropharynx was normal [Neck Appearance] : the appearance of the neck was normal [Neck Cervical Mass (___cm)] : no neck mass was observed [Jugular Venous Distention Increased] : there was no jugular-venous distention [Thyroid Diffuse Enlargement] : the thyroid was not enlarged [Thyroid Nodule] : there were no palpable thyroid nodules [Auscultation Breath Sounds / Voice Sounds] : lungs were clear to auscultation bilaterally [Heart Rate And Rhythm] : heart rate was normal and rhythm regular [Heart Sounds] : normal S1 and S2 [Murmurs] : no murmurs [Heart Sounds Gallop] : no gallops [Heart Sounds Pericardial Friction Rub] : no pericardial rub [Full Pulse] : the pedal pulses are present [Edema] : there was no peripheral edema [Abdomen Soft] : soft [Bowel Sounds] : normal bowel sounds [Abdomen Tenderness] : non-tender [Abdomen Mass (___ Cm)] : no abdominal mass palpated [No Spinal Tenderness] : no spinal tenderness [No CVA Tenderness] : no ~M costovertebral angle tenderness [Abnormal Walk] : normal gait [Nail Clubbing] : no clubbing  or cyanosis of the fingernails [Musculoskeletal - Swelling] : no joint swelling seen [Motor Tone] : muscle strength and tone were normal [Skin Color & Pigmentation] : normal skin color and pigmentation [] : no rash [Skin Turgor] : normal skin turgor [No Focal Deficits] : no focal deficits [Motor Exam] : the motor exam was normal [Oriented To Time, Place, And Person] : oriented to person, place, and time [Impaired Insight] : insight and judgment were intact [Affect] : the affect was normal

## 2024-03-26 RX ORDER — POTASSIUM CITRATE AND CITRIC ACID 334; 1100 MG/5ML; MG/5ML
1100-334 LIQUID ORAL DAILY
Qty: 1 | Refills: 3 | Status: ACTIVE | COMMUNITY
Start: 2024-03-25 | End: 1900-01-01

## 2024-03-28 RX ORDER — POTASSIUM CITRATE 15 MEQ/1
15 MEQ TABLET, EXTENDED RELEASE ORAL
Qty: 180 | Refills: 1 | Status: ACTIVE | COMMUNITY
Start: 2024-03-28 | End: 1900-01-01

## 2024-04-24 ENCOUNTER — APPOINTMENT (OUTPATIENT)
Dept: UROLOGY | Facility: CLINIC | Age: 59
End: 2024-04-24
Payer: COMMERCIAL

## 2024-04-24 DIAGNOSIS — R79.89 OTHER SPECIFIED ABNORMAL FINDINGS OF BLOOD CHEMISTRY: ICD-10-CM

## 2024-04-24 DIAGNOSIS — N20.0 CALCULUS OF KIDNEY: ICD-10-CM

## 2024-04-24 PROCEDURE — G2211 COMPLEX E/M VISIT ADD ON: CPT

## 2024-04-24 PROCEDURE — 99213 OFFICE O/P EST LOW 20 MIN: CPT

## 2024-04-24 NOTE — PLAN
[TextEntry] : renal sonogram shows no new stones or hydronephrosis continue stone prevention with Dr Melgar he wishes to see DR Martinez again for his low testosterone

## 2024-04-24 NOTE — HISTORY OF PRESENT ILLNESS
[FreeTextEntry1] : on Jan 2 2024- laser lithotripsy of right ureteral and right renal stones right renal stone fragments were removed with basket and sent for chemical analysis the stent was replaced with new 6 x 24 ureteral stent  Jan 12 2024 -- stent removed in office with cysto  He is seeing Dr Melgar for stone prevention  Jan 24 2024 US - no hydronephrosis or renal stones  has also been following Dr Wagner for low testosterone

## 2024-04-30 ENCOUNTER — OUTPATIENT (OUTPATIENT)
Dept: OUTPATIENT SERVICES | Facility: HOSPITAL | Age: 59
LOS: 1 days | End: 2024-04-30
Payer: COMMERCIAL

## 2024-04-30 VITALS
WEIGHT: 198.42 LBS | SYSTOLIC BLOOD PRESSURE: 144 MMHG | TEMPERATURE: 98 F | DIASTOLIC BLOOD PRESSURE: 70 MMHG | HEART RATE: 72 BPM | OXYGEN SATURATION: 98 % | RESPIRATION RATE: 16 BRPM | HEIGHT: 67 IN

## 2024-04-30 DIAGNOSIS — K11.20 SIALOADENITIS, UNSPECIFIED: ICD-10-CM

## 2024-04-30 DIAGNOSIS — Z96.0 PRESENCE OF UROGENITAL IMPLANTS: Chronic | ICD-10-CM

## 2024-04-30 DIAGNOSIS — Z01.818 ENCOUNTER FOR OTHER PREPROCEDURAL EXAMINATION: ICD-10-CM

## 2024-04-30 DIAGNOSIS — Z98.890 OTHER SPECIFIED POSTPROCEDURAL STATES: Chronic | ICD-10-CM

## 2024-04-30 PROCEDURE — 99214 OFFICE O/P EST MOD 30 MIN: CPT | Mod: 25

## 2024-04-30 NOTE — H&P PST ADULT - NS PRO FEM  PAP SMEARS 3YRS
not applicable (Male) Star Wedge Flap Text: The defect edges were debeveled with a #15 scalpel blade.  Given the location of the defect, shape of the defect and the proximity to free margins a star wedge flap was deemed most appropriate.  Using a sterile surgical marker, an appropriate rotation flap was drawn incorporating the defect and placing the expected incisions within the relaxed skin tension lines where possible. The area thus outlined was incised deep to adipose tissue with a #15 scalpel blade.  The skin margins were undermined to an appropriate distance in all directions utilizing iris scissors.

## 2024-04-30 NOTE — H&P PST ADULT - HISTORY OF PRESENT ILLNESS
PT REPORTS THAT IN APPROX LATE NOVEMBER EARLY DECEMBER HE NOTICED SWELLING RIGHT SUBMANDIBULAR REGION  WITH PAIN 10/10 WHICH WAS CONSTANT   SHORTLY AFTERWARDS HE EXPERIENCED AN INFECTION IN THAT REGION (POSS RIGHT SALIVARY GLAND)  PT WAS GIVEN ANTIBIOTICS WHICH RELIEVED PAIN AND CURED INFECTION.  HOWEVER ENT RECOMMENDED THIS PROCEDURE TO PREVENT INFECTION.    PATIENT CURRENTLY DENIES CHEST PAIN    PALPITATIONS,  DYSURIA, OR URI WITHIN THE IN PAST 2 WEEKS    -Denies travel outside the [place] in the past 30 days  -Patient denies any signs or symptoms of COVID 19 and denies contact with known positive individuals.    -Patient was instructed to quarantine pre-procedure, practice exposure control measures, continue to self-monitor and report any concerns to their proceduralist.  -Pt advised self quarantine till day of procedure    ANESTHESIA ALERT  NO--Difficult Airway  NO--History of neck surgery or radiation  NO--Limited ROM of neck  NO--History of Malignant hyperthermia  NO--No personal or family history of Pseudocholinesterase deficiency.  NO--Prior Anesthesia Complication  NO--Latex Allergy  NO--Loose teeth  NO--History of Rheumatoid Arthritis  NO--Bleeding risk  NO--HOSSEIN  NO--Implants  NO--Other_____    -PT DENIES ANY RASHES, ABRASION, OR OPEN WOUNDS     -Pt denies any suicidal ideation or thoughts.  Pt states not a threat to self or others.  DENIES DOMESTIC VIOLENCE      AS PER THE PT, THIS IS HIS/HER COMPLETE MEDICAL AND SURGICAL HX, INCLUDING MEDICATIONS PRESCRIBED AND OVER THE COUNTER    Patient verbalized understanding of instructions and was given the opportunity to ask questions and have them answered.    RCRI 0     CLASS RISK I     3.9  58.2 points  The higher the score (maximum 58.2), the higher the functional status.  9.89 METs      History of KRISTEN secondary to renal calculi    Resolved per patient

## 2024-04-30 NOTE — H&P PST ADULT - REASON FOR ADMISSION
Suite: CASProceduralist: Amor Collier  Confirmed Surgery DateTime: 05- - 0:00PAST DateTime: 04- - 8:30Procedure: RIGHT SUBMANDIBULAR GLAND EXCISION  ERP?: UnavailableLaterality: RightLength of Procedure: 90 Minutes  Anesthesia Type: General

## 2024-05-01 DIAGNOSIS — Z01.818 ENCOUNTER FOR OTHER PREPROCEDURAL EXAMINATION: ICD-10-CM

## 2024-05-01 DIAGNOSIS — K11.20 SIALOADENITIS, UNSPECIFIED: ICD-10-CM

## 2024-05-13 NOTE — ASU PATIENT PROFILE, ADULT - BRADEN SCORE (IF 18 OR LESS ACTIVATE SKIN INJURY RISK INCREASED GUIDELINE), MLM
Impression: Open angle with borderline findings, low risk, bilateral Plan: Due to moderate c/d. No change. IOPs normotensive. RTC 1 year CEE c possible OCT RNFL and HVF 24-2. 22

## 2024-05-13 NOTE — ASU PATIENT PROFILE, ADULT - FALL HARM RISK - UNIVERSAL INTERVENTIONS
Bed in lowest position, wheels locked, appropriate side rails in place/Call bell, personal items and telephone in reach/Instruct patient to call for assistance before getting out of bed or chair/Non-slip footwear when patient is out of bed/[place] to call system/Physically safe environment - no spills, clutter or unnecessary equipment/Purposeful Proactive Rounding/Room/bathroom lighting operational, light cord in reach

## 2024-05-14 ENCOUNTER — TRANSCRIPTION ENCOUNTER (OUTPATIENT)
Age: 59
End: 2024-05-14

## 2024-05-14 ENCOUNTER — APPOINTMENT (OUTPATIENT)
Dept: OTOLARYNGOLOGY | Facility: AMBULATORY SURGERY CENTER | Age: 59
End: 2024-05-14

## 2024-05-14 ENCOUNTER — RESULT REVIEW (OUTPATIENT)
Age: 59
End: 2024-05-14

## 2024-05-14 ENCOUNTER — OUTPATIENT (OUTPATIENT)
Dept: OUTPATIENT SERVICES | Facility: HOSPITAL | Age: 59
LOS: 1 days | Discharge: HOME | End: 2024-05-14
Payer: COMMERCIAL

## 2024-05-14 VITALS
DIASTOLIC BLOOD PRESSURE: 91 MMHG | HEART RATE: 68 BPM | SYSTOLIC BLOOD PRESSURE: 167 MMHG | HEIGHT: 67 IN | TEMPERATURE: 98 F | OXYGEN SATURATION: 97 % | WEIGHT: 198.42 LBS | RESPIRATION RATE: 17 BRPM

## 2024-05-14 VITALS
HEART RATE: 83 BPM | RESPIRATION RATE: 15 BRPM | DIASTOLIC BLOOD PRESSURE: 81 MMHG | SYSTOLIC BLOOD PRESSURE: 159 MMHG | OXYGEN SATURATION: 98 %

## 2024-05-14 DIAGNOSIS — K11.22 ACUTE RECURRENT SIALOADENITIS: ICD-10-CM

## 2024-05-14 DIAGNOSIS — K11.5 SIALOLITHIASIS: ICD-10-CM

## 2024-05-14 DIAGNOSIS — Z91.010 ALLERGY TO PEANUTS: ICD-10-CM

## 2024-05-14 DIAGNOSIS — I10 ESSENTIAL (PRIMARY) HYPERTENSION: ICD-10-CM

## 2024-05-14 DIAGNOSIS — Z96.0 PRESENCE OF UROGENITAL IMPLANTS: Chronic | ICD-10-CM

## 2024-05-14 DIAGNOSIS — K11.23 CHRONIC SIALOADENITIS: ICD-10-CM

## 2024-05-14 DIAGNOSIS — K11.20 SIALOADENITIS, UNSPECIFIED: ICD-10-CM

## 2024-05-14 DIAGNOSIS — Z98.890 OTHER SPECIFIED POSTPROCEDURAL STATES: Chronic | ICD-10-CM

## 2024-05-14 PROCEDURE — 88305 TISSUE EXAM BY PATHOLOGIST: CPT

## 2024-05-14 PROCEDURE — 88305 TISSUE EXAM BY PATHOLOGIST: CPT | Mod: 26

## 2024-05-14 PROCEDURE — 88300 SURGICAL PATH GROSS: CPT | Mod: 26,59

## 2024-05-14 PROCEDURE — C1889: CPT

## 2024-05-14 PROCEDURE — 42440 EXCISE SUBMAXILLARY GLAND: CPT | Mod: AS

## 2024-05-14 PROCEDURE — 88300 SURGICAL PATH GROSS: CPT

## 2024-05-14 PROCEDURE — C9399: CPT

## 2024-05-14 PROCEDURE — 42440 EXCISE SUBMAXILLARY GLAND: CPT

## 2024-05-14 RX ORDER — OXYCODONE AND ACETAMINOPHEN 5; 325 MG/1; MG/1
2 TABLET ORAL ONCE
Refills: 0 | Status: DISCONTINUED | OUTPATIENT
Start: 2024-05-14 | End: 2024-05-14

## 2024-05-14 RX ORDER — OXYCODONE HYDROCHLORIDE 5 MG/1
1 TABLET ORAL
Qty: 6 | Refills: 0
Start: 2024-05-14 | End: 2024-05-15

## 2024-05-14 RX ORDER — SODIUM CHLORIDE 9 MG/ML
1000 INJECTION, SOLUTION INTRAVENOUS
Refills: 0 | Status: DISCONTINUED | OUTPATIENT
Start: 2024-05-14 | End: 2024-05-14

## 2024-05-14 RX ORDER — HYDROMORPHONE HYDROCHLORIDE 2 MG/ML
0.5 INJECTION INTRAMUSCULAR; INTRAVENOUS; SUBCUTANEOUS
Refills: 0 | Status: DISCONTINUED | OUTPATIENT
Start: 2024-05-14 | End: 2024-05-14

## 2024-05-14 RX ORDER — LISINOPRIL 2.5 MG/1
1 TABLET ORAL
Refills: 0 | DISCHARGE

## 2024-05-14 RX ORDER — MEPERIDINE HYDROCHLORIDE 50 MG/ML
12.5 INJECTION INTRAMUSCULAR; INTRAVENOUS; SUBCUTANEOUS ONCE
Refills: 0 | Status: DISCONTINUED | OUTPATIENT
Start: 2024-05-14 | End: 2024-05-14

## 2024-05-14 RX ORDER — ONDANSETRON 8 MG/1
4 TABLET, FILM COATED ORAL ONCE
Refills: 0 | Status: DISCONTINUED | OUTPATIENT
Start: 2024-05-14 | End: 2024-05-14

## 2024-05-14 RX ADMIN — HYDROMORPHONE HYDROCHLORIDE 0.5 MILLIGRAM(S): 2 INJECTION INTRAMUSCULAR; INTRAVENOUS; SUBCUTANEOUS at 12:43

## 2024-05-14 RX ADMIN — HYDROMORPHONE HYDROCHLORIDE 0.5 MILLIGRAM(S): 2 INJECTION INTRAMUSCULAR; INTRAVENOUS; SUBCUTANEOUS at 12:30

## 2024-05-14 RX ADMIN — HYDROMORPHONE HYDROCHLORIDE 0.5 MILLIGRAM(S): 2 INJECTION INTRAMUSCULAR; INTRAVENOUS; SUBCUTANEOUS at 13:15

## 2024-05-14 RX ADMIN — SODIUM CHLORIDE 100 MILLILITER(S): 9 INJECTION, SOLUTION INTRAVENOUS at 12:23

## 2024-05-14 RX ADMIN — HYDROMORPHONE HYDROCHLORIDE 0.5 MILLIGRAM(S): 2 INJECTION INTRAMUSCULAR; INTRAVENOUS; SUBCUTANEOUS at 12:21

## 2024-05-14 NOTE — BRIEF OPERATIVE NOTE - COMMENTS
I, Obdulia Batista PA-C, performed the duties of first assist during the above listed surgery which includes, but is not limited to, retraction, suctioning and suturing. There was no competent resident available for the case.

## 2024-05-14 NOTE — ASU DISCHARGE PLAN (ADULT/PEDIATRIC) - NS MD DC FALL RISK RISK
For information on Fall & Injury Prevention, visit: [email or web address] OR  [email or web address] OR  https://www.cdc.gov/steadi/patient.html

## 2024-05-14 NOTE — ASU DISCHARGE PLAN (ADULT/PEDIATRIC) - CARE PROVIDER_API CALL
Amor Collier  Head/Neck Surgery  [address] [place] [id number]  Phone: [phone]  Fax: [phone]  Follow Up Time: 2 weeks

## 2024-05-14 NOTE — CHART NOTE - NSCHARTNOTEFT_GEN_A_CORE
PACU ANESTHESIA ADMISSION NOTE      Procedure: Excision, submandibular gland      Post op diagnosis:  Submandibular sialolithiasis        ____  Intubated  TV:______       Rate: ______      FiO2: ______    __x__  Patent Airway    _x___  Full return of protective reflexes    _x___  Full recovery from anesthesia / back to baseline status    Vitals:  temp(F) 98  /90  spo2 98  RR 17  pulse 80    Mental Status:  __x__ Awake   _____ Alert   _____ Drowsy   _____ Sedated    Nausea/Vomiting:  _x___ NO  ______Yes,   See Post - Op Orders          Pain Scale (0-10):  _____    Treatment: ____ None    x____ See Post - Op/PCA Orders    Post - Operative Fluids:   ____ Oral   ____x See Post - Op Orders    Plan: Discharge:   __x __Home       _____Floor     _____Critical Care    _____  Other:_________________    Comments: uneventful anesthesia course no complications. Vitals stable. Pt transferred to PACU

## 2024-05-14 NOTE — ASU DISCHARGE PLAN (ADULT/PEDIATRIC) - PATIENT EDUCATION MATERIALS PROVIED
drain instructions, pain management/Provider pre-printed instructions given/Pre-printed instructions given for other (specify)

## 2024-05-14 NOTE — BRIEF OPERATIVE NOTE - OPERATION/FINDINGS
~2cm stone right submandibular duct between hypoglossal and lingual nerves. Extensive fibrosis and inflammation, nerves preserved at conclusion of case

## 2024-05-14 NOTE — ASU DISCHARGE PLAN (ADULT/PEDIATRIC) - ASU DC SPECIAL INSTRUCTIONSFT
You are being discharged from [facility].    -Diet: Advance to regular diet as tolerated.  -Incisions: Keep dry for 24-48hrs. After that, you may shower but avoid aggressively scrubbing incision site.   -Medications: You may resume your home medications. Take over the counter pain medication as needed. A prescription has been sent to your pharmacy for a pain medication to be taken as needed for severe breakthrough pain. Please do not take this medication while driving or operating heavy machinery as it may make you drowsy.  -Follow-up: Please call the office to schedule a follow-up appointment with Dr. Collier within 2 weeks, or follow-up as previously scheduled.  -Please call the office with persistent fever greater than 101.4F, chest pain, shortness of breath, uncontrollable nausea/vomiting/pain, inability to tolerate oral intake, bleeding or worsening redness/drainage/swelling around wound. If you have any further questions about your care, please do not hesitate to contact Dr. Collier's office. You are being discharged from [facility].    -Diet: Advance to regular diet as tolerated.  -Incisions: Keep dry for 24-48hrs. After that, you may shower but avoid aggressively scrubbing incision site.   -Medications: You may resume your home medications. Take over the counter pain medication as needed. Prescriptions have been sent to your pharmacy for an antibiotic to be taken every 12 hours for 1 weeks. A prescription has also been sent to your pharmacy for a pain medication to be taken as needed for severe breakthrough pain. Please do not take this medication while driving or operating heavy machinery as it may make you drowsy.  -Follow-up: Please call the office to schedule a follow-up appointment with Dr. Collier within 2 weeks, or follow-up as previously scheduled.  -Please call the office with persistent fever greater than 101.4F, chest pain, shortness of breath, uncontrollable nausea/vomiting/pain, inability to tolerate oral intake, bleeding or worsening redness/drainage/swelling around wound. If you have any further questions about your care, please do not hesitate to contact Dr. Collier's office.

## 2024-05-16 ENCOUNTER — APPOINTMENT (OUTPATIENT)
Dept: OTOLARYNGOLOGY | Facility: CLINIC | Age: 59
End: 2024-05-16
Payer: COMMERCIAL

## 2024-05-16 VITALS — HEIGHT: 67 IN | WEIGHT: 194 LBS | BODY MASS INDEX: 30.45 KG/M2

## 2024-05-16 PROCEDURE — 99024 POSTOP FOLLOW-UP VISIT: CPT

## 2024-05-16 NOTE — PHYSICAL EXAM
IV[facility] AMBULATORY ENCOUNTER  MATERNAL FETAL MEDICINE VISIT      CHIEF COMPLAINT:    Chief Complaint   Patient presents with   • Pregnancy   • Office Visit     35 week MD OV       SUBJECTIVE:  Felicia Last is a 34 year old  ERROL:fetal demise and postpartum hemorrhage in G2 (2020), postpartum fetal movement, occasional Wesley Ramirez, Normal] : temporomandibular joint is normal fetal demise and postpartum hemorrhage in G2 (2020), postpartum de-identified] : drain removed right neck. Soft neck, no collections or hematoma Sun-Thurs follow night shift dosing  Friday [place] this pregnancy  2. G3: 4/3/2021 pLTCS at 30w2d for full cervical dilation and marbin breech position  1. Had postpartum  hemorrhage with EBL of 3L  2. Received 1u pRBCs  3. Status post MF counseling   4. Desired cerclage - Leblanc cerclage placed 2022   5. Vaginal progesterone 200 mg nightly starting at 16 weeks gestation and continuing until 36 weeks - reports compliance   6. Cerclage removal recommended between 36-37 weeks gestation. Scheduled for  with MFM MDs in office. Patient will be 36w5d at that time. Discussed with patient the option of getting cerclage roomed at time of repeat c/s if unable to remove in office. Patient agreeable.      History of  section x2  G1 2014: pLTCS at term for failure to progress in second stage  G2 17w5d:  after PPROM, required D&C for retained placenta  G3 2021: repeat LTCS at 30w2d due to full cervical dilation and marbin breech presentation  1. Patient desires repeat c/s - Recommendation at 39 weeks gestation. Scheduled for 22 at 0800, patient will be 39w0d.      Obesity  Prepregnancy BMI: 31  Current BMI: 33.88 kg/m²  1. Status post genetics consult 6/15/2022  1. NT - WNL   2. cffDNA negative 6/15/2022  2. Early 2 hr GTT positive for GDM  3. 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 Status post genetics consult 6/15/2022  1. NT - WNL   2. cffDNA negative 6/15/2022     History of substance use disorder  1. Heroin use disorder - in remission since   2. History of alcohol use disorder in remission   3. Marijuana use disorder - in remission since finding out she was pregnant  4. Tobacco use disorder - in remission since finding out she was pregnant      Recurrent major depressive disorder  1. Sees psychiatrist Dr. Sylvester in [place]   2. Current meds:   1. Bupropion 150 mg daily  2. Vortioxetine 20 mg daily   3. EPDS = 6 at FOB  4. Status post M  Felicia Perryandree along with the AMANDA. I agree with the above assessment and plan of care.   I spent 20 minutes with this patient face-to-face with greater than 50% of time spent in counseling.    Bertha Stallings, DO  Maternal-Fetal Medicine

## 2024-05-16 NOTE — REASON FOR VISIT
[Subsequent Evaluation] : a subsequent evaluation for [FreeTextEntry2] : sialadenitis , sialolithiasis

## 2024-05-16 NOTE — HISTORY OF PRESENT ILLNESS
[FreeTextEntry1] : Patient following  up today on  s/p right submandibular gland excision  05/14/2024.   sialadenitis , sialolithiasis .  Patient states the right side of face is swollen.

## 2024-05-20 LAB — SURGICAL PATHOLOGY STUDY: SIGNIFICANT CHANGE UP

## 2024-06-13 ENCOUNTER — APPOINTMENT (OUTPATIENT)
Dept: OTOLARYNGOLOGY | Facility: CLINIC | Age: 59
End: 2024-06-13
Payer: COMMERCIAL

## 2024-06-13 DIAGNOSIS — K11.20 SIALOADENITIS, UNSPECIFIED: ICD-10-CM

## 2024-06-13 DIAGNOSIS — K11.5 SIALOLITHIASIS: ICD-10-CM

## 2024-06-13 PROCEDURE — 99024 POSTOP FOLLOW-UP VISIT: CPT

## 2024-06-13 NOTE — REASON FOR VISIT
[Subsequent Evaluation] : a subsequent evaluation for [FreeTextEntry2] : s/p right submandibular gland excision, sialadenitis, sialothiasis

## 2024-06-13 NOTE — PHYSICAL EXAM
[Normal] : mucosa is normal [Midline] : trachea located in midline position [de-identified] : incision healed [de-identified] : Minimal right lip paresis, resolving compared to prior exam. Neck incision healing well, no hypertrophy

## 2024-06-13 NOTE — HISTORY OF PRESENT ILLNESS
[FreeTextEntry1] : Patient returns today s/p right submandibular gland excision 5/14/24, sialadenitis, sialothiasis. States that he is doing well. Slight numbness and swelling on right side of his face. Feels like he has some paralysis of the right side of his face. Notices some slurring of words when he talks. No signs of pain.

## 2024-07-15 ENCOUNTER — RX RENEWAL (OUTPATIENT)
Age: 59
End: 2024-07-15

## 2024-07-22 ENCOUNTER — APPOINTMENT (OUTPATIENT)
Dept: NEPHROLOGY | Facility: CLINIC | Age: 59
End: 2024-07-22
Payer: COMMERCIAL

## 2024-07-22 VITALS
TEMPERATURE: 99.1 F | HEART RATE: 80 BPM | DIASTOLIC BLOOD PRESSURE: 80 MMHG | WEIGHT: 194 LBS | SYSTOLIC BLOOD PRESSURE: 130 MMHG | BODY MASS INDEX: 30.45 KG/M2 | OXYGEN SATURATION: 98 % | HEIGHT: 67 IN

## 2024-07-22 PROCEDURE — 81003 URINALYSIS AUTO W/O SCOPE: CPT | Mod: QW

## 2024-07-22 PROCEDURE — 99214 OFFICE O/P EST MOD 30 MIN: CPT

## 2024-07-22 NOTE — ASSESSMENT
[FreeTextEntry1] : #) Nephrolithiasis:  Based on Litholink results, his stone production is primarily drive by low citrate production and low urine pH - Advised to increase water intake to 96 oz. (2.5-3L) Daily  - Low intake of salt, sugar and animal protein advised  - Advised to increase intake of potassium, citrate and elemental calcium from dairy-based sources  - Low intake of high oxalate-containing foods, including dark green leafy vegetables, nuts, berries, black tea, and dark cocoa  - Will continue K-Cit for now, but he will switch over to Litholyte 1 tablespoon TID after he finishes his current prescription - Litholink 24 hour urine testing ordered and to be performed in 4 months  #) Essential HTN:  BP controlled in clinic today  - advised salt restrictive diet of <2.4gm daily  - continue to monitor home BP readings and report in future clinic appointments  - continue use of Lisinopril  #) BPH without LUTS - management per Urology, currently off medication therapy for treatment

## 2024-07-22 NOTE — HISTORY OF PRESENT ILLNESS
[FreeTextEntry1] : Romero Livingston is a 57 yo M with history of right kidney stone s/p laser lithotrypsy and stent placement in Jan 2024 (removed stent 01/12/24), HTN, BPH and ED, here for follow-up of kidney stones.  Urine results from most recent Litholink reported high uric acid and UrAc SS, but improvement in urinary citrate, pH and CaPhos SS.  Prior stone analysis noted 100% Calcium oxalate monohydrate.    Recent Hospitalizations: none  Recent Medications changes: none  NSAIDS use: denies use  Home BP: no abnormal readings noted  Home FSBS:  no abnormal readings noted  LUTS: denies LUTS, foamy urine or hematuria  Uremic Symptoms: no pruritis, metallic taste, new onset weakness, dysphagia, poor appetite, or tremors noted  FamHx CKD/RRT:  denies  Personal History of CKD/RRT:  denies

## 2024-07-23 LAB
BILIRUB UR QL STRIP: NORMAL
CLARITY UR: CLEAR
COLLECTION METHOD: NORMAL
GLUCOSE UR-MCNC: NORMAL
HCG UR QL: 0.2 EU/DL
HGB UR QL STRIP.AUTO: NORMAL
KETONES UR-MCNC: NORMAL
LEUKOCYTE ESTERASE UR QL STRIP: NORMAL
NITRITE UR QL STRIP: NORMAL
PH UR STRIP: 7
PROT UR STRIP-MCNC: NORMAL
SP GR UR STRIP: 1.02

## 2024-08-08 NOTE — BRIEF OPERATIVE NOTE - ELECTIVE PROCEDURE
Yes Detail Level: Zone Render In Strict Bullet Format?: No Continue Regimen: Absorica 40 mg capsule \\nQuantity: 60.0 Capsule\\nSig: Take one tablet po BID

## 2024-09-19 ENCOUNTER — RX RENEWAL (OUTPATIENT)
Age: 59
End: 2024-09-19

## 2024-09-25 ENCOUNTER — APPOINTMENT (OUTPATIENT)
Dept: UROLOGY | Facility: CLINIC | Age: 59
End: 2024-09-25
Payer: COMMERCIAL

## 2024-09-25 VITALS — TEMPERATURE: 98 F | SYSTOLIC BLOOD PRESSURE: 176 MMHG | HEART RATE: 80 BPM | DIASTOLIC BLOOD PRESSURE: 112 MMHG

## 2024-09-25 DIAGNOSIS — Z00.00 ENCOUNTER FOR GENERAL ADULT MEDICAL EXAMINATION W/OUT ABNORMAL FINDINGS: ICD-10-CM

## 2024-09-25 DIAGNOSIS — E29.1 TESTICULAR HYPOFUNCTION: ICD-10-CM

## 2024-09-25 DIAGNOSIS — R35.1 NOCTURIA: ICD-10-CM

## 2024-09-25 DIAGNOSIS — R35.0 FREQUENCY OF MICTURITION: ICD-10-CM

## 2024-09-25 DIAGNOSIS — Z96.0 PRESENCE OF UROGENITAL IMPLANTS: ICD-10-CM

## 2024-09-25 DIAGNOSIS — R79.89 OTHER SPECIFIED ABNORMAL FINDINGS OF BLOOD CHEMISTRY: ICD-10-CM

## 2024-09-25 DIAGNOSIS — N20.0 CALCULUS OF KIDNEY: ICD-10-CM

## 2024-09-25 DIAGNOSIS — N20.1 CALCULUS OF URETER: ICD-10-CM

## 2024-09-25 LAB — ESTRADIOL SERPL-MCNC: 16 PG/ML

## 2024-09-25 PROCEDURE — 99214 OFFICE O/P EST MOD 30 MIN: CPT

## 2024-09-25 PROCEDURE — G2211 COMPLEX E/M VISIT ADD ON: CPT | Mod: NC

## 2024-09-25 NOTE — LETTER BODY
[Dear  ___] : Dear  [unfilled], [Courtesy Letter:] : I had the pleasure of seeing your patient, [unfilled], in my office today. [Please see my note below.] : Please see my note below. [Sincerely,] : Sincerely, [FreeTextEntry2] : Norman Akhtar MD [address] [place] [address]

## 2024-09-25 NOTE — PHYSICAL EXAM
[General Appearance - Well Developed] : well developed [General Appearance - Well Nourished] : well nourished [Normal Appearance] : normal appearance [Well Groomed] : well groomed [General Appearance - In No Acute Distress] : no acute distress [Edema] : no peripheral edema [Respiration, Rhythm And Depth] : normal respiratory rhythm and effort [Exaggerated Use Of Accessory Muscles For Inspiration] : no accessory muscle use [Abdomen Soft] : soft [Abdomen Tenderness] : non-tender [Costovertebral Angle Tenderness] : no ~M costovertebral angle tenderness [Normal Station and Gait] : the gait and station were normal for the patient's age [] : no rash [No Focal Deficits] : no focal deficits [Oriented To Time, Place, And Person] : oriented to person, place, and time [Affect] : the affect was normal [Mood] : the mood was normal [Not Anxious] : not anxious [FreeTextEntry1] : 30.38 [Chaperone Declined] : Patient declined chaperone

## 2024-09-25 NOTE — LETTER HEADER
[FreeTextEntry3] : Maryjane Martinez MD [facility], [address] [place] [address]

## 2024-09-25 NOTE — ASSESSMENT
[FreeTextEntry1] : As far as his hormones and the potential use of testosterone cypionate which she is willing to consider in the new off label subcutaneous fashion we have not checked his hormones in quite some time we will see how he is doing and go on from there  With respect to his prostate issues is really not bothering him enough to do anything but he has not had his PSA checked in a while and given his -American background and something that we discussed that he would like checked so we will order that  As far as his kidney stones he is under the care of the nephrologist he is taking a citrate enhancing agent and we will get a CAT scan in December as in the past he had punctate stones which the ultrasound might of missed.  He will follow-up after the blood tests and he will follow-up after the CAT scan

## 2024-09-25 NOTE — HISTORY OF PRESENT ILLNESS
[FreeTextEntry1] : Romero is a 59-year-old male born August 5, 1965 who I last saw January 28 and we have been treating him for TRT.  He had stopped everything, he ended up with a kidney stone which Dr. Clayton covered when he was on-call and he has been seeing Dr. Louis for stone prevention.  When I last saw him I felt that his levels were low and we had discussed the risks of hypogonadism he wanted to give it a try.  It is now over 9 months later he has not had repeat bloods but he is feeling good enough I would rather not get the medication.  We did discuss the relatively more recent method of subcutaneous testosterone cypionate and an off label use.  And if indicated he would be interested in that.  As far as his stone disease, the last ultrasound I have is from January 2024, which did show punctate bilateral stones besides the ureteral 1, while the ultrasound January 24, 2024 showed no stones and no hydronephrosis.  He saw the nephrologist 2 months ago and he felt he was not drinking enough water and gave him advice that we have discussed with him in the past taking it at least 2-1/2 to 3 L/day watching his salt sugar and animal protein intake increase inhibitors of potassium and citrate and if he gets calcium it should be from dairy based sources.  Avoiding high oxalate containing foods and he was going to switch over to litholyte 1 tablespoon 3 times a day as that gives similar protection and is much cheaper.  A repeat Litholink was requested for November.  Finally his prostate is somewhat enlarged but he says he is voiding well without issues and that is being monitored  He came in today to review the issues and make sure things are stable. [Urinary Urgency] : no urinary urgency [Urinary Frequency] : urinary frequency [Nocturia] : nocturia [Weak Stream] : weak stream [Intermittency] : no intermittency [Post-Void Dribbling] : post-void dribbling [Erectile Dysfunction] : no Erectile Dysfunction [Fatigue] : fatigue

## 2024-09-26 LAB
PSA FREE FLD-MCNC: 13 %
PSA FREE SERPL-MCNC: 0.05 NG/ML
PSA SERPL-MCNC: 0.39 NG/ML
SHBG SERPL-SCNC: 37.1 NMOL/L

## 2024-09-30 LAB
TESTOSTERONE FREE/WEAKLY BND: 4.3 NG/DL
TESTOSTERONE TOTAL S: 39 NG/DL
TESTOSTERONE, % FREE/WEAKLY BND: 11 %

## 2024-10-07 ENCOUNTER — APPOINTMENT (OUTPATIENT)
Dept: UROLOGY | Facility: CLINIC | Age: 59
End: 2024-10-07
Payer: COMMERCIAL

## 2024-10-07 VITALS
SYSTOLIC BLOOD PRESSURE: 146 MMHG | DIASTOLIC BLOOD PRESSURE: 85 MMHG | HEIGHT: 67 IN | BODY MASS INDEX: 32.65 KG/M2 | TEMPERATURE: 98 F | WEIGHT: 208 LBS

## 2024-10-07 DIAGNOSIS — E29.1 TESTICULAR HYPOFUNCTION: ICD-10-CM

## 2024-10-07 PROCEDURE — 99213 OFFICE O/P EST LOW 20 MIN: CPT

## 2024-10-07 PROCEDURE — G2211 COMPLEX E/M VISIT ADD ON: CPT | Mod: NC

## 2024-10-10 RX ORDER — TESTOSTERONE CYPIONATE 200 MG/ML
200 INJECTION, SOLUTION INTRAMUSCULAR
Qty: 4 | Refills: 0 | Status: ACTIVE | COMMUNITY
Start: 2024-10-07 | End: 1900-01-01

## 2024-10-10 RX ORDER — SYRINGE, DISPOSABLE, 1 ML
1 ML SYRINGE, EMPTY DISPOSABLE MISCELLANEOUS
Qty: 10 | Refills: 3 | Status: ACTIVE | COMMUNITY
Start: 2024-10-07 | End: 1900-01-01

## 2024-10-10 RX ORDER — NEEDLES, DISPOSABLE 25GX5/8"
23G X 1" NEEDLE, DISPOSABLE MISCELLANEOUS
Qty: 10 | Refills: 3 | Status: ACTIVE | COMMUNITY
Start: 2024-10-07 | End: 1900-01-01

## 2024-10-21 ENCOUNTER — OUTPATIENT (OUTPATIENT)
Dept: OUTPATIENT SERVICES | Facility: HOSPITAL | Age: 59
LOS: 1 days | End: 2024-10-21
Payer: COMMERCIAL

## 2024-10-21 ENCOUNTER — RESULT REVIEW (OUTPATIENT)
Age: 59
End: 2024-10-21

## 2024-10-21 DIAGNOSIS — Z98.890 OTHER SPECIFIED POSTPROCEDURAL STATES: Chronic | ICD-10-CM

## 2024-10-21 DIAGNOSIS — Z96.0 PRESENCE OF UROGENITAL IMPLANTS: Chronic | ICD-10-CM

## 2024-10-21 DIAGNOSIS — Z00.8 ENCOUNTER FOR OTHER GENERAL EXAMINATION: ICD-10-CM

## 2024-10-21 DIAGNOSIS — N20.0 CALCULUS OF KIDNEY: ICD-10-CM

## 2024-10-21 PROCEDURE — 74176 CT ABD & PELVIS W/O CONTRAST: CPT

## 2024-10-21 PROCEDURE — 74176 CT ABD & PELVIS W/O CONTRAST: CPT | Mod: 26

## 2024-10-22 DIAGNOSIS — N20.0 CALCULUS OF KIDNEY: ICD-10-CM

## 2024-12-02 ENCOUNTER — APPOINTMENT (OUTPATIENT)
Dept: NEPHROLOGY | Facility: CLINIC | Age: 59
End: 2024-12-02
Payer: COMMERCIAL

## 2024-12-02 VITALS
HEIGHT: 67 IN | DIASTOLIC BLOOD PRESSURE: 84 MMHG | WEIGHT: 195 LBS | BODY MASS INDEX: 30.61 KG/M2 | HEART RATE: 75 BPM | SYSTOLIC BLOOD PRESSURE: 160 MMHG | OXYGEN SATURATION: 97 %

## 2024-12-02 DIAGNOSIS — I10 ESSENTIAL (PRIMARY) HYPERTENSION: ICD-10-CM

## 2024-12-02 PROCEDURE — 99214 OFFICE O/P EST MOD 30 MIN: CPT

## 2024-12-02 RX ORDER — LISINOPRIL AND HYDROCHLOROTHIAZIDE TABLETS 20; 12.5 MG/1; MG/1
20-12.5 TABLET ORAL DAILY
Qty: 90 | Refills: 1 | Status: ACTIVE | COMMUNITY
Start: 2024-12-02 | End: 1900-01-01

## 2024-12-16 ENCOUNTER — APPOINTMENT (OUTPATIENT)
Dept: UROLOGY | Facility: CLINIC | Age: 59
End: 2024-12-16
Payer: COMMERCIAL

## 2024-12-16 VITALS
SYSTOLIC BLOOD PRESSURE: 177 MMHG | BODY MASS INDEX: 32.96 KG/M2 | DIASTOLIC BLOOD PRESSURE: 93 MMHG | WEIGHT: 210 LBS | HEIGHT: 67 IN | HEART RATE: 75 BPM | OXYGEN SATURATION: 98 %

## 2024-12-16 PROCEDURE — G2211 COMPLEX E/M VISIT ADD ON: CPT | Mod: NC

## 2024-12-16 PROCEDURE — 99214 OFFICE O/P EST MOD 30 MIN: CPT

## 2024-12-19 NOTE — PHYSICAL EXAM
Chief Complaint   Patient presents with    Post-op Visit       Vitals:    12/19/24 1236   BP: 114/76   BP Location: Left arm   Patient Position: Sitting   Cuff Size: Adult Regular   Pulse: 65   SpO2: 98%       There is no height or weight on file to calculate BMI.    Danny Parks EMT-P     [General Appearance - Alert] : alert [General Appearance - In No Acute Distress] : in no acute distress [Sclera] : the sclera and conjunctiva were normal [PERRL With Normal Accommodation] : pupils were equal in size, round, and reactive to light [Extraocular Movements] : extraocular movements were intact [Outer Ear] : the ears and nose were normal in appearance [Oropharynx] : the oropharynx was normal [Neck Appearance] : the appearance of the neck was normal [Neck Cervical Mass (___cm)] : no neck mass was observed [Jugular Venous Distention Increased] : there was no jugular-venous distention [Thyroid Diffuse Enlargement] : the thyroid was not enlarged [Thyroid Nodule] : there were no palpable thyroid nodules [Auscultation Breath Sounds / Voice Sounds] : lungs were clear to auscultation bilaterally [Heart Rate And Rhythm] : heart rate was normal and rhythm regular [Heart Sounds] : normal S1 and S2 [Heart Sounds Gallop] : no gallops [Murmurs] : no murmurs [Heart Sounds Pericardial Friction Rub] : no pericardial rub [Full Pulse] : the pedal pulses are present [Edema] : there was no peripheral edema [Bowel Sounds] : normal bowel sounds [Abdomen Soft] : soft [Abdomen Tenderness] : non-tender [Abdomen Mass (___ Cm)] : no abdominal mass palpated [No CVA Tenderness] : no ~M costovertebral angle tenderness [No Spinal Tenderness] : no spinal tenderness [Abnormal Walk] : normal gait [Nail Clubbing] : no clubbing  or cyanosis of the fingernails [Musculoskeletal - Swelling] : no joint swelling seen [Motor Tone] : muscle strength and tone were normal [Skin Color & Pigmentation] : normal skin color and pigmentation [Skin Turgor] : normal skin turgor [] : no rash [Motor Exam] : the motor exam was normal [No Focal Deficits] : no focal deficits [Oriented To Time, Place, And Person] : oriented to person, place, and time [Impaired Insight] : insight and judgment were intact [Affect] : the affect was normal

## 2024-12-23 ENCOUNTER — APPOINTMENT (OUTPATIENT)
Dept: UROLOGY | Facility: CLINIC | Age: 59
End: 2024-12-23
Payer: COMMERCIAL

## 2024-12-23 DIAGNOSIS — E29.1 TESTICULAR HYPOFUNCTION: ICD-10-CM

## 2024-12-23 PROCEDURE — G2211 COMPLEX E/M VISIT ADD ON: CPT | Mod: NC

## 2024-12-23 PROCEDURE — 99214 OFFICE O/P EST MOD 30 MIN: CPT

## 2024-12-31 RX ORDER — SODIUM BICARBONATE 650 MG/1
650 TABLET ORAL
Qty: 90 | Refills: 2 | Status: ACTIVE | COMMUNITY
Start: 2024-12-31 | End: 1900-01-01

## 2025-01-16 ENCOUNTER — APPOINTMENT (OUTPATIENT)
Dept: UROLOGY | Facility: CLINIC | Age: 60
End: 2025-01-16
Payer: COMMERCIAL

## 2025-01-16 VITALS
HEART RATE: 96 BPM | DIASTOLIC BLOOD PRESSURE: 77 MMHG | HEIGHT: 67 IN | WEIGHT: 210 LBS | BODY MASS INDEX: 32.96 KG/M2 | SYSTOLIC BLOOD PRESSURE: 147 MMHG | OXYGEN SATURATION: 98 %

## 2025-01-16 DIAGNOSIS — Z87.898 PERSONAL HISTORY OF OTHER SPECIFIED CONDITIONS: ICD-10-CM

## 2025-01-16 DIAGNOSIS — E29.1 TESTICULAR HYPOFUNCTION: ICD-10-CM

## 2025-01-16 PROCEDURE — G2211 COMPLEX E/M VISIT ADD ON: CPT | Mod: NC

## 2025-01-16 PROCEDURE — 99214 OFFICE O/P EST MOD 30 MIN: CPT

## 2025-01-16 RX ORDER — TESTOSTERONE CYPIONATE 200 MG/ML
200 INJECTION, SOLUTION INTRAMUSCULAR
Qty: 1 | Refills: 0 | Status: ACTIVE | COMMUNITY
Start: 2025-01-16 | End: 1900-01-01

## 2025-01-16 RX ORDER — TESTOSTERONE CYPIONATE 200 MG/ML
200 INJECTION, SOLUTION INTRAMUSCULAR
Qty: 1 | Refills: 0 | Status: DISCONTINUED | OUTPATIENT
Start: 2025-01-16 | End: 2025-01-16

## 2025-04-08 ENCOUNTER — APPOINTMENT (OUTPATIENT)
Dept: UROLOGY | Facility: CLINIC | Age: 60
End: 2025-04-08
Payer: COMMERCIAL

## 2025-04-08 VITALS
DIASTOLIC BLOOD PRESSURE: 82 MMHG | SYSTOLIC BLOOD PRESSURE: 159 MMHG | BODY MASS INDEX: 31.55 KG/M2 | WEIGHT: 201 LBS | HEIGHT: 67 IN | TEMPERATURE: 97.3 F | HEART RATE: 84 BPM

## 2025-04-08 DIAGNOSIS — E29.1 TESTICULAR HYPOFUNCTION: ICD-10-CM

## 2025-04-08 PROCEDURE — 99214 OFFICE O/P EST MOD 30 MIN: CPT

## 2025-04-14 ENCOUNTER — RX RENEWAL (OUTPATIENT)
Age: 60
End: 2025-04-14

## 2025-04-23 ENCOUNTER — RESULT REVIEW (OUTPATIENT)
Age: 60
End: 2025-04-23

## 2025-04-23 ENCOUNTER — OUTPATIENT (OUTPATIENT)
Dept: OUTPATIENT SERVICES | Facility: HOSPITAL | Age: 60
LOS: 1 days | End: 2025-04-23
Payer: COMMERCIAL

## 2025-04-23 DIAGNOSIS — Z96.0 PRESENCE OF UROGENITAL IMPLANTS: Chronic | ICD-10-CM

## 2025-04-23 DIAGNOSIS — Z98.890 OTHER SPECIFIED POSTPROCEDURAL STATES: Chronic | ICD-10-CM

## 2025-04-23 DIAGNOSIS — Z00.8 ENCOUNTER FOR OTHER GENERAL EXAMINATION: ICD-10-CM

## 2025-04-23 DIAGNOSIS — N20.1 CALCULUS OF URETER: ICD-10-CM

## 2025-04-23 PROCEDURE — 76775 US EXAM ABDO BACK WALL LIM: CPT | Mod: 26

## 2025-04-23 PROCEDURE — 76775 US EXAM ABDO BACK WALL LIM: CPT

## 2025-04-24 DIAGNOSIS — N20.1 CALCULUS OF URETER: ICD-10-CM

## 2025-06-02 ENCOUNTER — APPOINTMENT (OUTPATIENT)
Dept: NEPHROLOGY | Facility: CLINIC | Age: 60
End: 2025-06-02
Payer: COMMERCIAL

## 2025-06-02 VITALS
HEART RATE: 64 BPM | HEIGHT: 67 IN | DIASTOLIC BLOOD PRESSURE: 76 MMHG | SYSTOLIC BLOOD PRESSURE: 122 MMHG | BODY MASS INDEX: 30.61 KG/M2 | OXYGEN SATURATION: 98 % | WEIGHT: 195 LBS

## 2025-06-02 PROCEDURE — 99214 OFFICE O/P EST MOD 30 MIN: CPT

## 2025-06-02 RX ORDER — DAPAGLIFLOZIN 5 MG/1
5 TABLET, FILM COATED ORAL
Refills: 0 | Status: ACTIVE | COMMUNITY

## 2025-06-02 RX ORDER — ROSUVASTATIN CALCIUM 20 MG/1
20 TABLET, FILM COATED ORAL
Refills: 0 | Status: ACTIVE | COMMUNITY

## 2025-06-02 RX ORDER — OLMESARTAN MEDOXOMIL AND HYDROCHLOROTHIAZIDE 40; 12.5 MG/1; MG/1
40-12.5 TABLET ORAL
Refills: 0 | Status: ACTIVE | COMMUNITY

## 2025-06-09 ENCOUNTER — APPOINTMENT (OUTPATIENT)
Dept: UROLOGY | Facility: CLINIC | Age: 60
End: 2025-06-09
Payer: MEDICARE

## 2025-06-09 VITALS
BODY MASS INDEX: 31.08 KG/M2 | OXYGEN SATURATION: 98 % | HEIGHT: 67 IN | SYSTOLIC BLOOD PRESSURE: 121 MMHG | WEIGHT: 198 LBS | HEART RATE: 66 BPM | DIASTOLIC BLOOD PRESSURE: 79 MMHG

## 2025-06-09 PROCEDURE — 99205 OFFICE O/P NEW HI 60 MIN: CPT

## 2025-06-09 PROCEDURE — G2211 COMPLEX E/M VISIT ADD ON: CPT
